# Patient Record
Sex: FEMALE | Race: WHITE | NOT HISPANIC OR LATINO | Employment: OTHER | URBAN - METROPOLITAN AREA
[De-identification: names, ages, dates, MRNs, and addresses within clinical notes are randomized per-mention and may not be internally consistent; named-entity substitution may affect disease eponyms.]

---

## 2019-07-23 ENCOUNTER — HOSPITAL ENCOUNTER (OUTPATIENT)
Dept: LAB | Facility: MEDICAL CENTER | Age: 54
End: 2019-07-23
Attending: FAMILY MEDICINE
Payer: COMMERCIAL

## 2019-07-23 LAB
25(OH)D3 SERPL-MCNC: 41 NG/ML (ref 30–100)
APPEARANCE UR: CLEAR
BASOPHILS # BLD AUTO: 1.1 % (ref 0–1.8)
BASOPHILS # BLD: 0.08 K/UL (ref 0–0.12)
BILIRUB UR QL STRIP.AUTO: NEGATIVE
COLOR UR: YELLOW
DHEA-S SERPL-MCNC: 70.7 UG/DL (ref 18.9–205)
EOSINOPHIL # BLD AUTO: 0.1 K/UL (ref 0–0.51)
EOSINOPHIL NFR BLD: 1.4 % (ref 0–6.9)
ERYTHROCYTE [DISTWIDTH] IN BLOOD BY AUTOMATED COUNT: 44.3 FL (ref 35.9–50)
EST. AVERAGE GLUCOSE BLD GHB EST-MCNC: 108 MG/DL
ESTRADIOL SERPL-MCNC: <20 PG/ML
FERRITIN SERPL-MCNC: 41.5 NG/ML (ref 10–291)
GGT SERPL-CCNC: 13 U/L (ref 7–34)
GLUCOSE UR STRIP.AUTO-MCNC: NEGATIVE MG/DL
HBA1C MFR BLD: 5.4 % (ref 0–5.6)
HCT VFR BLD AUTO: 43.9 % (ref 37–47)
HGB BLD-MCNC: 14.6 G/DL (ref 12–16)
IMM GRANULOCYTES # BLD AUTO: 0.01 K/UL (ref 0–0.11)
IMM GRANULOCYTES NFR BLD AUTO: 0.1 % (ref 0–0.9)
KETONES UR STRIP.AUTO-MCNC: ABNORMAL MG/DL
LEUKOCYTE ESTERASE UR QL STRIP.AUTO: NEGATIVE
LYMPHOCYTES # BLD AUTO: 2.21 K/UL (ref 1–4.8)
LYMPHOCYTES NFR BLD: 31 % (ref 22–41)
MCH RBC QN AUTO: 30.3 PG (ref 27–33)
MCHC RBC AUTO-ENTMCNC: 33.3 G/DL (ref 33.6–35)
MCV RBC AUTO: 91.1 FL (ref 81.4–97.8)
MICRO URNS: ABNORMAL
MONOCYTES # BLD AUTO: 0.66 K/UL (ref 0–0.85)
MONOCYTES NFR BLD AUTO: 9.2 % (ref 0–13.4)
NEUTROPHILS # BLD AUTO: 4.08 K/UL (ref 2–7.15)
NEUTROPHILS NFR BLD: 57.2 % (ref 44–72)
NITRITE UR QL STRIP.AUTO: NEGATIVE
NRBC # BLD AUTO: 0 K/UL
NRBC BLD-RTO: 0 /100 WBC
PH UR STRIP.AUTO: 6 [PH]
PLATELET # BLD AUTO: 343 K/UL (ref 164–446)
PMV BLD AUTO: 9.9 FL (ref 9–12.9)
PROGEST SERPL-MCNC: 1.53 NG/ML
PROT UR QL STRIP: NEGATIVE MG/DL
RBC # BLD AUTO: 4.82 M/UL (ref 4.2–5.4)
RBC UR QL AUTO: NEGATIVE
SP GR UR STRIP.AUTO: <=1.005
T3FREE SERPL-MCNC: 3.53 PG/ML (ref 2.4–4.2)
T4 FREE SERPL-MCNC: 0.86 NG/DL (ref 0.53–1.43)
TSH SERPL DL<=0.005 MIU/L-ACNC: 1.91 UIU/ML (ref 0.38–5.33)
UROBILINOGEN UR STRIP.AUTO-MCNC: 0.2 MG/DL
WBC # BLD AUTO: 7.1 K/UL (ref 4.8–10.8)

## 2019-07-23 PROCEDURE — 84305 ASSAY OF SOMATOMEDIN: CPT

## 2019-07-23 PROCEDURE — 84482 T3 REVERSE: CPT

## 2019-07-23 PROCEDURE — 82977 ASSAY OF GGT: CPT

## 2019-07-23 PROCEDURE — 80053 COMPREHEN METABOLIC PANEL: CPT

## 2019-07-23 PROCEDURE — 84270 ASSAY OF SEX HORMONE GLOBUL: CPT

## 2019-07-23 PROCEDURE — 80061 LIPID PANEL: CPT

## 2019-07-23 PROCEDURE — 83615 LACTATE (LD) (LDH) ENZYME: CPT

## 2019-07-23 PROCEDURE — 82627 DEHYDROEPIANDROSTERONE: CPT

## 2019-07-23 PROCEDURE — 83036 HEMOGLOBIN GLYCOSYLATED A1C: CPT

## 2019-07-23 PROCEDURE — 84481 FREE ASSAY (FT-3): CPT

## 2019-07-23 PROCEDURE — 82670 ASSAY OF TOTAL ESTRADIOL: CPT

## 2019-07-23 PROCEDURE — 84550 ASSAY OF BLOOD/URIC ACID: CPT

## 2019-07-23 PROCEDURE — 83090 ASSAY OF HOMOCYSTEINE: CPT

## 2019-07-23 PROCEDURE — 82306 VITAMIN D 25 HYDROXY: CPT

## 2019-07-23 PROCEDURE — 81003 URINALYSIS AUTO W/O SCOPE: CPT

## 2019-07-23 PROCEDURE — 83550 IRON BINDING TEST: CPT

## 2019-07-23 PROCEDURE — 84144 ASSAY OF PROGESTERONE: CPT

## 2019-07-23 PROCEDURE — 84378 SUGARS SINGLE QUANT: CPT

## 2019-07-23 PROCEDURE — 83525 ASSAY OF INSULIN: CPT

## 2019-07-23 PROCEDURE — 85025 COMPLETE CBC W/AUTO DIFF WBC: CPT

## 2019-07-23 PROCEDURE — 82728 ASSAY OF FERRITIN: CPT

## 2019-07-23 PROCEDURE — 86141 C-REACTIVE PROTEIN HS: CPT

## 2019-07-23 PROCEDURE — 84443 ASSAY THYROID STIM HORMONE: CPT

## 2019-07-23 PROCEDURE — 83540 ASSAY OF IRON: CPT

## 2019-07-23 PROCEDURE — 84439 ASSAY OF FREE THYROXINE: CPT

## 2019-07-23 PROCEDURE — 82679 ASSAY OF ESTRONE: CPT

## 2019-07-23 PROCEDURE — 84403 ASSAY OF TOTAL TESTOSTERONE: CPT

## 2019-07-23 PROCEDURE — 84100 ASSAY OF PHOSPHORUS: CPT

## 2019-07-23 PROCEDURE — 36415 COLL VENOUS BLD VENIPUNCTURE: CPT

## 2019-07-24 LAB
ALBUMIN SERPL BCP-MCNC: 4.9 G/DL (ref 3.2–4.9)
ALBUMIN/GLOB SERPL: 1.8 G/DL
ALP SERPL-CCNC: 46 U/L (ref 30–99)
ALT SERPL-CCNC: 15 U/L (ref 2–50)
ANION GAP SERPL CALC-SCNC: 7 MMOL/L (ref 0–11.9)
AST SERPL-CCNC: 17 U/L (ref 12–45)
BILIRUB SERPL-MCNC: 0.5 MG/DL (ref 0.1–1.5)
BUN SERPL-MCNC: 10 MG/DL (ref 8–22)
CALCIUM SERPL-MCNC: 9.7 MG/DL (ref 8.5–10.5)
CHLORIDE SERPL-SCNC: 105 MMOL/L (ref 96–112)
CHOLEST SERPL-MCNC: 225 MG/DL (ref 100–199)
CO2 SERPL-SCNC: 27 MMOL/L (ref 20–33)
CREAT SERPL-MCNC: 0.7 MG/DL (ref 0.5–1.4)
CRP SERPL HS-MCNC: 0.5 MG/L (ref 0–7.5)
GLOBULIN SER CALC-MCNC: 2.8 G/DL (ref 1.9–3.5)
GLUCOSE SERPL-MCNC: 93 MG/DL (ref 65–99)
HCYS SERPL-SCNC: 5.39 UMOL/L
HDLC SERPL-MCNC: 86 MG/DL
IRON SATN MFR SERPL: 35 % (ref 15–55)
IRON SERPL-MCNC: 131 UG/DL (ref 40–170)
LDH SERPL L TO P-CCNC: 149 U/L (ref 107–266)
LDLC SERPL CALC-MCNC: 123 MG/DL
PHOSPHATE SERPL-MCNC: 3.8 MG/DL (ref 2.5–4.5)
POTASSIUM SERPL-SCNC: 3.5 MMOL/L (ref 3.6–5.5)
PROT SERPL-MCNC: 7.7 G/DL (ref 6–8.2)
SODIUM SERPL-SCNC: 139 MMOL/L (ref 135–145)
TIBC SERPL-MCNC: 372 UG/DL (ref 250–450)
TRIGL SERPL-MCNC: 82 MG/DL (ref 0–149)
URATE SERPL-MCNC: 3.5 MG/DL (ref 1.9–8.2)

## 2019-07-25 LAB
IGF-I SERPL-MCNC: 132 NG/ML (ref 49–234)
IGF-I Z-SCORE SERPL: 0.3
INSULIN P FAST SERPL-ACNC: 3 UIU/ML (ref 3–19)
TEST NAME 95000: NORMAL

## 2019-07-26 LAB
ESTRONE SERPL-MCNC: 17.2 PG/ML
T3REVERSE SERPL-MCNC: 15.6 NG/DL (ref 9–27)

## 2019-07-27 LAB
SHBG SERPL-SCNC: 183 NMOL/L (ref 30–135)
TESTOST FREE SERPL-MCNC: 3.2 PG/ML (ref 0.6–3.8)
TESTOST SERPL-MCNC: 67 NG/DL (ref 9–55)

## 2020-01-13 ENCOUNTER — HOSPITAL ENCOUNTER (OUTPATIENT)
Dept: LAB | Facility: MEDICAL CENTER | Age: 55
End: 2020-01-13
Attending: FAMILY MEDICINE
Payer: COMMERCIAL

## 2020-01-13 LAB
BASOPHILS # BLD AUTO: 1.3 % (ref 0–1.8)
BASOPHILS # BLD: 0.09 K/UL (ref 0–0.12)
EOSINOPHIL # BLD AUTO: 0.11 K/UL (ref 0–0.51)
EOSINOPHIL NFR BLD: 1.6 % (ref 0–6.9)
ERYTHROCYTE [DISTWIDTH] IN BLOOD BY AUTOMATED COUNT: 45.3 FL (ref 35.9–50)
HCT VFR BLD AUTO: 43.4 % (ref 37–47)
HGB BLD-MCNC: 14.1 G/DL (ref 12–16)
IMM GRANULOCYTES # BLD AUTO: 0.02 K/UL (ref 0–0.11)
IMM GRANULOCYTES NFR BLD AUTO: 0.3 % (ref 0–0.9)
LYMPHOCYTES # BLD AUTO: 2.1 K/UL (ref 1–4.8)
LYMPHOCYTES NFR BLD: 30.7 % (ref 22–41)
MCH RBC QN AUTO: 30.1 PG (ref 27–33)
MCHC RBC AUTO-ENTMCNC: 32.5 G/DL (ref 33.6–35)
MCV RBC AUTO: 92.5 FL (ref 81.4–97.8)
MONOCYTES # BLD AUTO: 0.61 K/UL (ref 0–0.85)
MONOCYTES NFR BLD AUTO: 8.9 % (ref 0–13.4)
NEUTROPHILS # BLD AUTO: 3.9 K/UL (ref 2–7.15)
NEUTROPHILS NFR BLD: 57.2 % (ref 44–72)
NRBC # BLD AUTO: 0 K/UL
NRBC BLD-RTO: 0 /100 WBC
PLATELET # BLD AUTO: 343 K/UL (ref 164–446)
PMV BLD AUTO: 9.7 FL (ref 9–12.9)
RBC # BLD AUTO: 4.69 M/UL (ref 4.2–5.4)
WBC # BLD AUTO: 6.8 K/UL (ref 4.8–10.8)

## 2020-01-13 PROCEDURE — 80053 COMPREHEN METABOLIC PANEL: CPT

## 2020-01-13 PROCEDURE — 36415 COLL VENOUS BLD VENIPUNCTURE: CPT

## 2020-01-13 PROCEDURE — 82679 ASSAY OF ESTRONE: CPT

## 2020-01-13 PROCEDURE — 82306 VITAMIN D 25 HYDROXY: CPT

## 2020-01-13 PROCEDURE — 84140 ASSAY OF PREGNENOLONE: CPT

## 2020-01-13 PROCEDURE — 84144 ASSAY OF PROGESTERONE: CPT

## 2020-01-13 PROCEDURE — 84443 ASSAY THYROID STIM HORMONE: CPT

## 2020-01-13 PROCEDURE — 83525 ASSAY OF INSULIN: CPT

## 2020-01-13 PROCEDURE — 83090 ASSAY OF HOMOCYSTEINE: CPT

## 2020-01-13 PROCEDURE — 86038 ANTINUCLEAR ANTIBODIES: CPT

## 2020-01-13 PROCEDURE — 80061 LIPID PANEL: CPT

## 2020-01-13 PROCEDURE — 82627 DEHYDROEPIANDROSTERONE: CPT

## 2020-01-13 PROCEDURE — 84270 ASSAY OF SEX HORMONE GLOBUL: CPT

## 2020-01-13 PROCEDURE — 84482 T3 REVERSE: CPT

## 2020-01-13 PROCEDURE — 84403 ASSAY OF TOTAL TESTOSTERONE: CPT

## 2020-01-13 PROCEDURE — 84305 ASSAY OF SOMATOMEDIN: CPT

## 2020-01-13 PROCEDURE — 83516 IMMUNOASSAY NONANTIBODY: CPT

## 2020-01-13 PROCEDURE — 86141 C-REACTIVE PROTEIN HS: CPT

## 2020-01-13 PROCEDURE — 86039 ANTINUCLEAR ANTIBODIES (ANA): CPT

## 2020-01-13 PROCEDURE — 82248 BILIRUBIN DIRECT: CPT

## 2020-01-13 PROCEDURE — 84402 ASSAY OF FREE TESTOSTERONE: CPT

## 2020-01-13 PROCEDURE — 84439 ASSAY OF FREE THYROXINE: CPT

## 2020-01-13 PROCEDURE — 84146 ASSAY OF PROLACTIN: CPT

## 2020-01-13 PROCEDURE — 82670 ASSAY OF TOTAL ESTRADIOL: CPT

## 2020-01-13 PROCEDURE — 85025 COMPLETE CBC W/AUTO DIFF WBC: CPT

## 2020-01-13 PROCEDURE — 83036 HEMOGLOBIN GLYCOSYLATED A1C: CPT

## 2020-01-13 PROCEDURE — 84481 FREE ASSAY (FT-3): CPT

## 2020-01-14 LAB
25(OH)D3 SERPL-MCNC: 50 NG/ML (ref 30–100)
ALBUMIN SERPL BCP-MCNC: 4.6 G/DL (ref 3.2–4.9)
ALBUMIN/GLOB SERPL: 1.7 G/DL
ALP SERPL-CCNC: 51 U/L (ref 30–99)
ALT SERPL-CCNC: 12 U/L (ref 2–50)
ANION GAP SERPL CALC-SCNC: 11 MMOL/L (ref 0–11.9)
AST SERPL-CCNC: 17 U/L (ref 12–45)
BILIRUB SERPL-MCNC: 0.6 MG/DL (ref 0.1–1.5)
BUN SERPL-MCNC: 13 MG/DL (ref 8–22)
CALCIUM SERPL-MCNC: 8.9 MG/DL (ref 8.5–10.5)
CHLORIDE SERPL-SCNC: 103 MMOL/L (ref 96–112)
CHOLEST SERPL-MCNC: 191 MG/DL (ref 100–199)
CO2 SERPL-SCNC: 27 MMOL/L (ref 20–33)
CREAT SERPL-MCNC: 0.66 MG/DL (ref 0.5–1.4)
CRP SERPL HS-MCNC: 1 MG/L (ref 0–7.5)
DHEA-S SERPL-MCNC: 74.5 UG/DL (ref 18.9–205)
EST. AVERAGE GLUCOSE BLD GHB EST-MCNC: 108 MG/DL
ESTRADIOL SERPL-MCNC: 22 PG/ML
GLOBULIN SER CALC-MCNC: 2.7 G/DL (ref 1.9–3.5)
GLUCOSE SERPL-MCNC: 77 MG/DL (ref 65–99)
HBA1C MFR BLD: 5.4 % (ref 0–5.6)
HCYS SERPL-SCNC: 6.72 UMOL/L
HDLC SERPL-MCNC: 85 MG/DL
LDLC SERPL CALC-MCNC: 94 MG/DL
POTASSIUM SERPL-SCNC: 3.6 MMOL/L (ref 3.6–5.5)
PROLACTIN SERPL-MCNC: 11.42 NG/ML (ref 2.8–26)
PROT SERPL-MCNC: 7.3 G/DL (ref 6–8.2)
SODIUM SERPL-SCNC: 141 MMOL/L (ref 135–145)
T3FREE SERPL-MCNC: 4.45 PG/ML (ref 2.4–4.2)
T4 FREE SERPL-MCNC: 0.78 NG/DL (ref 0.53–1.43)
TRIGL SERPL-MCNC: 59 MG/DL (ref 0–149)
TSH SERPL DL<=0.005 MIU/L-ACNC: 0.71 UIU/ML (ref 0.38–5.33)

## 2020-01-15 LAB
BILIRUB CONJ SERPL-MCNC: 0.1 MG/DL (ref 0.1–0.5)
BILIRUB INDIRECT SERPL-MCNC: 0.5 MG/DL (ref 0–1)
IGF-I SERPL-MCNC: 126 NG/ML (ref 49–234)
IGF-I Z-SCORE SERPL: 0.2
INSULIN P FAST SERPL-ACNC: 2 UIU/ML (ref 3–19)
MITOCHONDRIA M2 IGG SER-ACNC: 96.6 UNITS (ref 0–20)
NUCLEAR IGG SER QL IA: DETECTED
PREG SERPL-MCNC: 53 NG/DL (ref 15–132)
PROGEST SERPL-MCNC: 1.54 NG/ML

## 2020-01-16 LAB
ANA INTERPRETIVE COMMENT Q5143: ABNORMAL
ANA PATTERN Q5144: ABNORMAL
ANA TITER Q5145: ABNORMAL
ANTINUCLEAR ANTIBODY (ANA), HEP-2, IGG Q5142: DETECTED
ESTRONE SERPL-MCNC: 22.5 PG/ML

## 2020-01-17 LAB — T3REVERSE SERPL-MCNC: 19.4 NG/DL (ref 9–27)

## 2020-01-24 LAB
SHBG SERPL-SCNC: 199 NMOL/L (ref 30–135)
TESTOST FREE SERPL-MCNC: 0.7 PG/ML (ref 0.6–3.8)
TESTOST SERPL-MCNC: 15 NG/DL (ref 9–55)

## 2020-07-15 ENCOUNTER — APPOINTMENT (OUTPATIENT)
Dept: RADIOLOGY | Facility: MEDICAL CENTER | Age: 55
End: 2020-07-15
Attending: OTOLARYNGOLOGY
Payer: COMMERCIAL

## 2020-07-30 ENCOUNTER — APPOINTMENT (OUTPATIENT)
Dept: LAB | Facility: MEDICAL CENTER | Age: 55
End: 2020-07-30
Attending: FAMILY MEDICINE
Payer: COMMERCIAL

## 2020-07-31 ENCOUNTER — HOSPITAL ENCOUNTER (OUTPATIENT)
Dept: LAB | Facility: MEDICAL CENTER | Age: 55
End: 2020-07-31
Attending: FAMILY MEDICINE
Payer: COMMERCIAL

## 2020-07-31 LAB
25(OH)D3 SERPL-MCNC: 154 NG/ML (ref 30–100)
ALBUMIN SERPL BCP-MCNC: 4.6 G/DL (ref 3.2–4.9)
ALBUMIN/GLOB SERPL: 1.8 G/DL
ALP SERPL-CCNC: 71 U/L (ref 30–99)
ALT SERPL-CCNC: 23 U/L (ref 2–50)
ANION GAP SERPL CALC-SCNC: 13 MMOL/L (ref 7–16)
AST SERPL-CCNC: 17 U/L (ref 12–45)
BASOPHILS # BLD AUTO: 1.2 % (ref 0–1.8)
BASOPHILS # BLD: 0.07 K/UL (ref 0–0.12)
BILIRUB SERPL-MCNC: 0.3 MG/DL (ref 0.1–1.5)
BUN SERPL-MCNC: 18 MG/DL (ref 8–22)
CALCIUM SERPL-MCNC: 9.3 MG/DL (ref 8.5–10.5)
CHLORIDE SERPL-SCNC: 101 MMOL/L (ref 96–112)
CHOLEST SERPL-MCNC: 252 MG/DL (ref 100–199)
CO2 SERPL-SCNC: 25 MMOL/L (ref 20–33)
CREAT SERPL-MCNC: 0.59 MG/DL (ref 0.5–1.4)
CRP SERPL HS-MCNC: 0.4 MG/L (ref 0–7.5)
DHEA-S SERPL-MCNC: 61.9 UG/DL (ref 18.9–205)
EOSINOPHIL # BLD AUTO: 0.09 K/UL (ref 0–0.51)
EOSINOPHIL NFR BLD: 1.6 % (ref 0–6.9)
ERYTHROCYTE [DISTWIDTH] IN BLOOD BY AUTOMATED COUNT: 47.9 FL (ref 35.9–50)
EST. AVERAGE GLUCOSE BLD GHB EST-MCNC: 105 MG/DL
ESTRADIOL SERPL-MCNC: <5 PG/ML
FERRITIN SERPL-MCNC: 103 NG/ML (ref 10–291)
GLOBULIN SER CALC-MCNC: 2.5 G/DL (ref 1.9–3.5)
GLUCOSE SERPL-MCNC: 93 MG/DL (ref 65–99)
HBA1C MFR BLD: 5.3 % (ref 0–5.6)
HCT VFR BLD AUTO: 45.7 % (ref 37–47)
HDLC SERPL-MCNC: 95 MG/DL
HGB BLD-MCNC: 14.5 G/DL (ref 12–16)
IMM GRANULOCYTES # BLD AUTO: 0.01 K/UL (ref 0–0.11)
IMM GRANULOCYTES NFR BLD AUTO: 0.2 % (ref 0–0.9)
LDLC SERPL CALC-MCNC: 150 MG/DL
LYMPHOCYTES # BLD AUTO: 2.05 K/UL (ref 1–4.8)
LYMPHOCYTES NFR BLD: 35.5 % (ref 22–41)
MCH RBC QN AUTO: 29.2 PG (ref 27–33)
MCHC RBC AUTO-ENTMCNC: 31.7 G/DL (ref 33.6–35)
MCV RBC AUTO: 92.1 FL (ref 81.4–97.8)
MONOCYTES # BLD AUTO: 0.43 K/UL (ref 0–0.85)
MONOCYTES NFR BLD AUTO: 7.5 % (ref 0–13.4)
NEUTROPHILS # BLD AUTO: 3.12 K/UL (ref 2–7.15)
NEUTROPHILS NFR BLD: 54 % (ref 44–72)
NRBC # BLD AUTO: 0 K/UL
NRBC BLD-RTO: 0 /100 WBC
PLATELET # BLD AUTO: 293 K/UL (ref 164–446)
PMV BLD AUTO: 10.9 FL (ref 9–12.9)
POTASSIUM SERPL-SCNC: 4.1 MMOL/L (ref 3.6–5.5)
PROT SERPL-MCNC: 7.1 G/DL (ref 6–8.2)
RBC # BLD AUTO: 4.96 M/UL (ref 4.2–5.4)
SODIUM SERPL-SCNC: 139 MMOL/L (ref 135–145)
T3FREE SERPL-MCNC: 2.76 PG/ML (ref 2–4.4)
T4 FREE SERPL-MCNC: 1.17 NG/DL (ref 0.93–1.7)
THYROPEROXIDASE AB SERPL-ACNC: <9 IU/ML (ref 0–9)
TRIGL SERPL-MCNC: 37 MG/DL (ref 0–149)
TSH SERPL DL<=0.005 MIU/L-ACNC: 1.54 UIU/ML (ref 0.38–5.33)
WBC # BLD AUTO: 5.8 K/UL (ref 4.8–10.8)

## 2020-07-31 PROCEDURE — 82627 DEHYDROEPIANDROSTERONE: CPT

## 2020-07-31 PROCEDURE — 84270 ASSAY OF SEX HORMONE GLOBUL: CPT

## 2020-07-31 PROCEDURE — 84481 FREE ASSAY (FT-3): CPT

## 2020-07-31 PROCEDURE — 86800 THYROGLOBULIN ANTIBODY: CPT

## 2020-07-31 PROCEDURE — 86039 ANTINUCLEAR ANTIBODIES (ANA): CPT

## 2020-07-31 PROCEDURE — 82306 VITAMIN D 25 HYDROXY: CPT

## 2020-07-31 PROCEDURE — 84482 T3 REVERSE: CPT

## 2020-07-31 PROCEDURE — 83921 ORGANIC ACID SINGLE QUANT: CPT

## 2020-07-31 PROCEDURE — 80061 LIPID PANEL: CPT

## 2020-07-31 PROCEDURE — 36415 COLL VENOUS BLD VENIPUNCTURE: CPT

## 2020-07-31 PROCEDURE — 82679 ASSAY OF ESTRONE: CPT

## 2020-07-31 PROCEDURE — 84144 ASSAY OF PROGESTERONE: CPT

## 2020-07-31 PROCEDURE — 83516 IMMUNOASSAY NONANTIBODY: CPT

## 2020-07-31 PROCEDURE — 83036 HEMOGLOBIN GLYCOSYLATED A1C: CPT

## 2020-07-31 PROCEDURE — 84403 ASSAY OF TOTAL TESTOSTERONE: CPT

## 2020-07-31 PROCEDURE — 86038 ANTINUCLEAR ANTIBODIES: CPT

## 2020-07-31 PROCEDURE — 80053 COMPREHEN METABOLIC PANEL: CPT

## 2020-07-31 PROCEDURE — 83520 IMMUNOASSAY QUANT NOS NONAB: CPT | Mod: 91

## 2020-07-31 PROCEDURE — 86141 C-REACTIVE PROTEIN HS: CPT

## 2020-07-31 PROCEDURE — 85652 RBC SED RATE AUTOMATED: CPT

## 2020-07-31 PROCEDURE — 85025 COMPLETE CBC W/AUTO DIFF WBC: CPT

## 2020-07-31 PROCEDURE — 83525 ASSAY OF INSULIN: CPT

## 2020-07-31 PROCEDURE — 82670 ASSAY OF TOTAL ESTRADIOL: CPT

## 2020-07-31 PROCEDURE — 83090 ASSAY OF HOMOCYSTEINE: CPT

## 2020-07-31 PROCEDURE — 84443 ASSAY THYROID STIM HORMONE: CPT

## 2020-07-31 PROCEDURE — 84402 ASSAY OF FREE TESTOSTERONE: CPT

## 2020-07-31 PROCEDURE — 82728 ASSAY OF FERRITIN: CPT

## 2020-07-31 PROCEDURE — 84439 ASSAY OF FREE THYROXINE: CPT

## 2020-07-31 PROCEDURE — 84305 ASSAY OF SOMATOMEDIN: CPT

## 2020-07-31 PROCEDURE — 86376 MICROSOMAL ANTIBODY EACH: CPT

## 2020-08-01 LAB
ERYTHROCYTE [SEDIMENTATION RATE] IN BLOOD BY WESTERGREN METHOD: 4 MM/HOUR (ref 0–30)
HCYS SERPL-SCNC: 5.43 UMOL/L
PROGEST SERPL-MCNC: 0.08 NG/ML

## 2020-08-02 LAB
MITOCHONDRIA M2 IGG SER-ACNC: 90.1 UNITS (ref 0–20)
NUCLEAR IGG SER QL IA: DETECTED

## 2020-08-03 LAB
IL6 SERPL-MCNC: <2 PG/ML
MISCELLANEOUS LAB RESULT MISCLAB: NORMAL

## 2020-08-04 LAB
ANA INTERPRETIVE COMMENT Q5143: ABNORMAL
ANA PATTERN Q5144: ABNORMAL
ANA TITER Q5145: ABNORMAL
ANTINUCLEAR ANTIBODY (ANA), HEP-2, IGG Q5142: DETECTED

## 2020-08-05 LAB
ESTRONE SERPL-MCNC: 13.7 PG/ML
INSULIN P FAST SERPL-ACNC: 4 UIU/ML (ref 3–19)

## 2020-08-06 LAB — METHYLMALONATE SERPL-SCNC: <0.1 UMOL/L (ref 0–0.4)

## 2020-08-07 LAB
SHBG SERPL-SCNC: 184 NMOL/L (ref 30–135)
TESTOST FREE SERPL-MCNC: 1.8 PG/ML (ref 0.6–3.8)
TESTOST SERPL-MCNC: 38 NG/DL (ref 9–55)

## 2020-08-08 LAB — T3REVERSE SERPL-MCNC: 15.5 NG/DL (ref 9–27)

## 2020-08-11 LAB
IGF-I SERPL-MCNC: 93 NG/ML (ref 48–235)
IGF-I Z-SCORE SERPL: -0.5
THYROGLOB AB SERPL-ACNC: <0.9 IU/ML (ref 0–4)

## 2020-08-12 ENCOUNTER — HOSPITAL ENCOUNTER (OUTPATIENT)
Dept: RADIOLOGY | Facility: MEDICAL CENTER | Age: 55
End: 2020-08-12
Attending: FAMILY MEDICINE
Payer: COMMERCIAL

## 2020-08-12 DIAGNOSIS — M81.0 SENILE OSTEOPOROSIS: ICD-10-CM

## 2020-08-12 PROCEDURE — 77080 DXA BONE DENSITY AXIAL: CPT

## 2020-10-07 ENCOUNTER — HOSPITAL ENCOUNTER (OUTPATIENT)
Dept: LAB | Facility: MEDICAL CENTER | Age: 55
End: 2020-10-07
Attending: FAMILY MEDICINE
Payer: COMMERCIAL

## 2020-10-07 LAB
CA-I SERPL-SCNC: 1.2 MMOL/L (ref 1.1–1.3)
CALCIUM SERPL-MCNC: 9.2 MG/DL (ref 8.5–10.5)
PTH-INTACT SERPL-MCNC: 30.7 PG/ML (ref 14–72)

## 2020-10-07 PROCEDURE — 86160 COMPLEMENT ANTIGEN: CPT

## 2020-10-07 PROCEDURE — 84586 ASSAY OF VIP: CPT

## 2020-10-07 PROCEDURE — 83520 IMMUNOASSAY QUANT NOS NONAB: CPT | Mod: 91

## 2020-10-07 PROCEDURE — 82310 ASSAY OF CALCIUM: CPT

## 2020-10-07 PROCEDURE — 83970 ASSAY OF PARATHORMONE: CPT

## 2020-10-07 PROCEDURE — 82330 ASSAY OF CALCIUM: CPT

## 2020-10-07 PROCEDURE — 83516 IMMUNOASSAY NONANTIBODY: CPT

## 2020-10-07 PROCEDURE — 84588 ASSAY OF VASOPRESSIN: CPT

## 2020-10-07 PROCEDURE — 36415 COLL VENOUS BLD VENIPUNCTURE: CPT

## 2020-10-07 PROCEDURE — 83519 RIA NONANTIBODY: CPT

## 2020-10-09 LAB — GLIADIN IGA SER IA-ACNC: 3 UNITS (ref 0–19)

## 2020-10-12 LAB
MISCELLANEOUS LAB RESULT MISCLAB: NORMAL
VIP SERPL-MCNC: <13 PG/ML (ref 0–60)

## 2020-10-15 LAB — MISCELLANEOUS LAB RESULT MISCLAB: NORMAL

## 2020-10-16 LAB — MISCELLANEOUS LAB RESULT MISCLAB: NORMAL

## 2020-10-21 ENCOUNTER — OFFICE VISIT (OUTPATIENT)
Dept: RHEUMATOLOGY | Facility: MEDICAL CENTER | Age: 55
End: 2020-10-21
Payer: COMMERCIAL

## 2020-10-21 VITALS
DIASTOLIC BLOOD PRESSURE: 50 MMHG | TEMPERATURE: 97.7 F | RESPIRATION RATE: 12 BRPM | WEIGHT: 109 LBS | BODY MASS INDEX: 19.31 KG/M2 | HEART RATE: 76 BPM | HEIGHT: 63 IN | OXYGEN SATURATION: 99 % | SYSTOLIC BLOOD PRESSURE: 90 MMHG

## 2020-10-21 DIAGNOSIS — E03.9 HYPOTHYROIDISM, UNSPECIFIED TYPE: ICD-10-CM

## 2020-10-21 DIAGNOSIS — M34.1 CREST SYNDROME (HCC): ICD-10-CM

## 2020-10-21 DIAGNOSIS — K75.4 AUTOIMMUNE HEPATITIS (HCC): ICD-10-CM

## 2020-10-21 DIAGNOSIS — I73.00 RAYNAUD'S DISEASE WITHOUT GANGRENE: ICD-10-CM

## 2020-10-21 DIAGNOSIS — N39.3 STRESS INCONTINENCE: ICD-10-CM

## 2020-10-21 DIAGNOSIS — R76.8 RHEUMATOID FACTOR POSITIVE: ICD-10-CM

## 2020-10-21 PROCEDURE — 99205 OFFICE O/P NEW HI 60 MIN: CPT | Performed by: INTERNAL MEDICINE

## 2020-10-21 ASSESSMENT — FIBROSIS 4 INDEX: FIB4 SCORE: 0.68

## 2020-10-21 NOTE — PROGRESS NOTES
Chief Complaint-    Chief Complaint   Patient presents with   • New Patient     Yaneli Gaspar is a 55 y.o. female here as a new Rheumatology Consult referred by Fede Santos D.O.  HPI:   This is a new patient evaluation   Patient brings in a 2 inch thick bundle of paperwork for us to review today.    This is a new patient evaluation, patient was referred for evaluation of a past diagnosis of scleroderma/crest/rheumatoid arthritis.  Patient was diagnosed about 10 years ago patient states she had symptoms about 5 years prior to that with symptoms of esophageal dysplasia requiring a dose of corticosteroids to help calm down esophageal dysplasia.  Patient states she has never had strictures nor any dilation of her esophagus.  Patient initially saw Dr. Aguirre with [facility] who did more testing, patient was resistant to be put on any treatment options and has managed her own symptoms with exercise, diet and vitamin supplements.  Patient states that she is followed by Dr. Molina in cardiology, follows Dr. Ornelas every 3 years last echocardiogram about 1 year ago and normal per patient report.  Patient denies any shortness of breath, does get some mild Raynaud's but no digital tip ulcerations, does get occasional esophageal dysplasia but denies any aspiration, denies any calcinosis denies any hidebound skin.    Patient states positive AMA, s/p eval by GI and s/p liver scan still ok per patient report    S/p PFT's and cardiology Dr Molina, patient sees q 3 years    On physical exam also noted lymph node has been ongoing for about 6 months right lower jaw    Other issues patient also complains of stress incontinence has an appointment to see urology for further evaluation.    Patient also complains of her hair hurting and skin hurting today as well, of note patient's mother carries a diagnosis of fibromyalgia.         PMHx  Hypothyroid  Tonsillectomy  Ovarian cyst removed  Bilateral breast  implants    Fam Hx  F passed from oral cancer, hx of testicular cancer  M-hypothyroid, fibromyalgia  SX1- ulcerative colitis  Daughter-celiac disease    Soc Hx  No tob  ETOH q 2 months  No blood tx  No tattoos  NO IVDA    ANTHONY 1:160 6/2012 [facility]  Anti centromere >8.0 6/2012 [facility]  AMA 96.6 1/2020; AMA 90.1 8/2020  dsDNA neg 6/2012 [facility]  RNP neg 6/2012 [facility]  Smith neg 6/2012 [facility]  SCL-70 neg6/2012 [facility]  SSA neg 6/2012 [facility]  SSB neg 6/2012 [facility]  WILL-1 neg 6/2012 [facility]  C3 91 nl 6/2012 [facility]  C4 22 nl 6/2012 [facility]  CCP neg 6/2012 [facility]  RA 16.1 7/2015 [facility]  Gliadin ab neg 10/2020  TPO neg 7/2020  Thyroglobulin ab neg 7/2020  DEXA 8/12/2020 T scores -3.2, -2.4  FRAX 8/12/2020 major osteoporotic fracture risk 13.0%, hip fracture risk 3.9%    Addendum 12/14/2020  DATE OF SERVICE:  12/09/2020      PULMONARY FUNCTION TEST INTERPRETATION  The patient had pulmonary function testing done on 12/09/2020.  The pulmonary   function testing showed an FEV1/FVC ratio of 81% predicted, which is normal   and does not represent any evidence of obstruction.  The total lung capacity   is also normal at 112% predicted, not consistent with any restrictive lung   disease.  The diffusion capacity is normal at 122% predicted, which is also   normal.  In summary, the pulmonary function testing including spirometry, the   flow-volume loops, the lung volumes, and the diffusion capacity are all within   normal limits.         Current medicines (including changes today)  Current Outpatient Medications   Medication Sig Dispense Refill   • NALTREXONE HCL PO Take  by mouth. LDN     • Transdermal Base CREA 2.5 mg by Apply externally route.     • progesterone (PROMETRIUM) 100 MG CAPS Take 100 mg by mouth every day.     • Testosterone 10 MG/ACT (2%) GEL Apply  to skin as directed.     • METHYLCOBALAMIN 1 mg by Does not apply route.     • NS SOLN 50 mL with folic acid 5 MG/ML SOLN 10 mg 10 mg by Intravenous route Once. 50 mL     • Potassium 99 MG TABS Take  by mouth.     • Probiotic Product (SOLUBLE FIBER/PROBIOTICS PO) Take  by mouth.     • MAGNESIUM ACETATE by Does not apply route.     • THYROID PO Take  by mouth. COMPOUND T3 T4 1.5 GRAIN DAILY      • Multiple Vitamin (DAILY MULTIVITAMIN) TABS Take  by mouth.     • VITAMIN D PO Take  by mouth.     • Ascorbic Acid (VITAMIN C PO) Take  by mouth.     • FISH OIL by Does not apply route.     • Methylsulfonylmethane (MSM PO) Take  by mouth.     • epinephrine (ADRENALIN) 0.1 MG/ML SOLN 2 mg by Injection route Once.     • Apoaequorin (PREVAGEN PO) Take 40 mg by mouth.       No current facility-administered medications for this visit.      She  has a past medical history of Chronic fatigue, Infectious disease, Raynauds syndrome, Snoring, Unspecified disorder of thyroid, and Urinary bladder disorder.  She  has a past surgical history that includes tonsillectomy (); other (2005); and stapedectomy (2009).  History reviewed. No pertinent family history.  Family Status   Relation Name Status   • Mo  Alive   • Fa 67  at age 67        meloma   • Sis ##Sis1 Alive             Social History     Tobacco Use   • Smoking status: Never Smoker   • Smokeless tobacco: Never Used   Substance Use Topics   • Alcohol use: Yes     Comment: 2-3 per month   • Drug use: No     Social History     Social History Narrative   • Not on file       ROS   Other than what is mentioned in HPI or physical exam, there is no history of headaches, double vision or blurred vision. No temporal tenderness or jaw claudication.  No trouble swallowing difficulties or sore throats.  No chest complaints including chest pain, cough or sputum production. No GI complaints including nausea, vomiting, change in bowel habits, or past peptic ulcer disease. No history of blood in the stools. No urinary complaints including dysuria or frequency. No history of alopecia, photosensitivity, oral ulcerations, Raynaud's  "phenomena.       Objective:     BP (!) 90/50   Pulse 76   Temp 36.5 °C (97.7 °F) (Temporal)   Resp 12   Ht 1.6 m (5' 3\")   Wt 49.4 kg (109 lb)   SpO2 99%  Body mass index is 19.31 kg/m².  Physical Exam:    Constitutional: Alert and oriented X3, no distress.Skin: Warm, dry, good turgor, no rashes in visible areas, did not see any hidebound skin, no dactylitis no digital tip ulcerations, no telangiectasias on the face of the palms of the hands noted today.  Eye: Equal, round and reactive, conjunctiva clear, lids normal EOM intactENMT: Lips without lesions, good dentition, no oropharyngeal ulcers, moist buccal mucosa, pinna without deformityNeck: Trachea midline, no masses, no thyromegaly, patient with lymph node on the right jaw nontender to palpation.Lymph:  No cervical lymphadenopathy, no axillary lymphadenopathy, no supraclavicular lymphadenopathyRespiratory: Unlabored respiratory effort, lungs clear to auscultation, no wheezes, no ronchi, good sounds in all quadrants.Cardiovascular: Normal S1, S2, no murmur, no edema.Abdomen: Soft, non-tender, no masses, no hepatosplenomegaly.Psych: Alert and oriented x3, normal affect and mood.Neuro Cranial nerves 2-12 are grossly intact, no loss of sensation LEExt:no joint laxity noted in bilateral arms, no joint laxity noted in bilateral legs, no digital tip ulcerations no active Raynaud's no swan-neck but no deformities no dactylitis no sausage digits no swan-neck or boutonniere deformities, no flexion contractures no nodules no tophi    Lab Results   Component Value Date/Time    SODIUM 139 07/31/2020 11:16 AM    POTASSIUM 4.1 07/31/2020 11:16 AM    CHLORIDE 101 07/31/2020 11:16 AM    CO2 25 07/31/2020 11:16 AM    GLUCOSE 93 07/31/2020 11:16 AM    BUN 18 07/31/2020 11:16 AM    CREATININE 0.59 07/31/2020 11:16 AM      Lab Results   Component Value Date/Time    WBC 5.8 07/31/2020 11:16 AM    RBC 4.96 07/31/2020 11:16 AM    HEMOGLOBIN 14.5 07/31/2020 11:16 AM    HEMATOCRIT " 45.7 07/31/2020 11:16 AM    MCV 92.1 07/31/2020 11:16 AM    MCH 29.2 07/31/2020 11:16 AM    MCHC 31.7 (L) 07/31/2020 11:16 AM    MPV 10.9 07/31/2020 11:16 AM    NEUTSPOLYS 54.00 07/31/2020 11:16 AM    LYMPHOCYTES 35.50 07/31/2020 11:16 AM    MONOCYTES 7.50 07/31/2020 11:16 AM    EOSINOPHILS 1.60 07/31/2020 11:16 AM    BASOPHILS 1.20 07/31/2020 11:16 AM      Lab Results   Component Value Date/Time    CALCIUM 9.2 10/07/2020 05:20 PM    ASTSGOT 17 07/31/2020 11:16 AM    ALTSGPT 23 07/31/2020 11:16 AM    ALKPHOSPHAT 71 07/31/2020 11:16 AM    TBILIRUBIN 0.3 07/31/2020 11:16 AM    ALBUMIN 4.6 07/31/2020 11:16 AM    TOTPROTEIN 7.1 07/31/2020 11:16 AM     Lab Results   Component Value Date/Time    URICACID 3.5 07/23/2019 02:29 PM    ANTINUCAB Detected (A) 07/31/2020 11:16 AM     Lab Results   Component Value Date/Time    SEDRATEWES 4 07/31/2020 11:16 AM     Lab Results   Component Value Date/Time    COLORURINE Yellow 07/23/2019 02:29 PM    SPECGRAVITY <=1.005 07/23/2019 02:29 PM    PHURINE 6.0 07/23/2019 02:29 PM    GLUCOSEUR Negative 07/23/2019 02:29 PM    KETONES Trace (A) 07/23/2019 02:29 PM    PROTEINURIN Negative 07/23/2019 02:29 PM       Lab Results   Component Value Date/Time    HBA1C 5.3 07/31/2020 11:16 AM     Lab Results   Component Value Date/Time    TSHULTRASEN 1.540 07/31/2020 11:16 AM    FREET4 1.17 07/31/2020 11:16 AM     Lab Results   Component Value Date/Time    MICROSOMALA <9.0 07/31/2020 11:16 AM    ANTITHYROGL <0.9 07/31/2020 11:16 AM     Lab Results   Component Value Date/Time    ANTIMITOCHO 90.1 (H) 07/31/2020 11:16 AM     Lab Results   Component Value Date/Time    PTHINTACT 30.7 10/07/2020 05:20 PM     Results for orders placed during the hospital encounter of 08/12/20   DS-BONE DENSITY STUDY (DEXA)    Impression According to the World Health Organization classification, bone mineral density of this patient is osteoporosis with high fracture risk lumbar spine, osteopenia with increased fracture risk  left femur.        10-year Probability of Fracture:  Major Osteoporotic     13.0%  Hip     3.9%  Population      [place] ()    Based on left femur neck BMD          INTERPRETING LOCATION:  [address] [place] [address], [place], [id number]       Assessment and Plan:  1. CREST syndrome (HCC)  Currently on no medication, patient prefers to be on no medication, patient has not had PFTs for some time will order PFTs to assure no development of restrictive lung disease  Patient states she had an echocardiogram with Dr. Ornelas about a year and a half ago, patient to bring that in with her at next visit for evaluation  - PULMONARY FUNCTION TESTS -Test requested: Complete Pulmonary Function Test; Future    2. Rheumatoid factor positive  Next visit check status of hand and feet x-rays, patient has not had hand and feet x-rays in her records then we will need to do those for establishment of erosive arthritis or not patient may warrant additional treatment.    3. Raynaud's disease without gangrene  Patient had been offered calcium channel blockers for which patient opts out, patient's PCP did offer nitroglycerin ointment for the patient has not used    4. Hypothyroidism, unspecified type  Can exacerbate joint pain, I recommend monitoring thyroid on a regular basis to assure it is not contributing to the patient's joint pain    5. Stress incontinence  Patient scheduled to see urology for further evaluation    6) Osteoporosis  Recent bone density scan done August 2020 indicate osteoporosis both by T-scores and by FRAX score, discussed with patient patient refuses treatment at this time   continue calcium citrate 1200 mg by mouth daily and vitamin D about 2000 units by mouth daily and magnesium 200 mg by mouth daily    7) positive AMA/autoimmune hepatitis  Compatible with autoimmune hepatitis or sclerosing cholangitis, liver ultrasound negative thus far per patient report, already is followed by GI.    8)  lymphadenopthy  Right  lower jaw lymph node nontender palpation ongoing for about 6 months, CBC stable, no lymphocytosis consider possible lymph node biopsy will address in 4 weeks.    Records requested.  Followup: Return in about 4 weeks (around 11/18/2020). or sooner prn for 60-minute appointment    Patient was seen 60 minutes face-to-face (excluding time for procedures)  of which more than 50% the time was spent counseling the patient regarding  rheumatological conditions and care. Therapy was discussed in detail.  Thank you for this referral.    Please note that this dictation was created using voice recognition software. I have made every reasonable attempt to correct obvious errors, but I expect that there are errors of grammar and possibly content that I did not discover before finalizing the note.

## 2020-10-21 NOTE — ASSESSMENT & PLAN NOTE
This is a new patient evaluation  Patient here to get evaluated for RA and scleroderma and CREST initially dx about 10 years ago with symptoms of esophageal dysplasia with positive blood tests. Patient had symptoms about 5 years prior to symptoms. Patient then patient saw Dr Aguirre with [facility] who did more testing, patient was not started on any medications patient has been self treating with diet and supplements and has not been on any treatment.    Patient states positive AMA, s/p eval by GI and s/p liver scan still ok    S/p PFT's and cardiology Dr Molina, patient sees q 3 years    PMHx  Hypothyroid  Tonsillectomy  Ovarian cyst removed  Bilateral breast implants    Fam Hx  F passed from oral cancer, hx of testicular cancer  M-hypothyroid, fibromyalgia  SX1- ulcerative colitis    Soc Hx  No tob  ETOH q 2 months  No blood tx  No tattoos  NO IVDA    ANTHONY 1:160 6/2012 [facility]  Anti centromere >8.0 6/2012 [facility]  dsDNA neg 6/2012 [facility]  RNP neg 6/2012 [facility]  Smith neg 6/2012 [facility]  SCL-70 neg6/2012 [facility]  SSA neg 6/2012 [facility]  SSB neg 6/2012 [facility]  WILL-1 neg 6/2012 [facility]  C3 91 nl 6/2012 [facility]  C4 22 nl 6/2012 [facility]  CCP neg 6/2012 [facility]  RA 16.1 7/2015 [facility]

## 2020-10-25 LAB
MISCELLANEOUS LAB RESULT MISCLAB: NORMAL
MISCELLANEOUS LAB RESULT MISCLAB: NORMAL

## 2020-10-27 LAB
CANCELLED TEST NOTIFICATION CANTE: NORMAL
CANCELLED TEST NOTIFICATION CANTE: NORMAL

## 2020-10-28 ENCOUNTER — HOSPITAL ENCOUNTER (OUTPATIENT)
Dept: LAB | Facility: MEDICAL CENTER | Age: 55
End: 2020-10-28
Attending: FAMILY MEDICINE
Payer: COMMERCIAL

## 2020-10-28 PROCEDURE — 36415 COLL VENOUS BLD VENIPUNCTURE: CPT

## 2020-10-28 PROCEDURE — 86160 COMPLEMENT ANTIGEN: CPT

## 2020-11-09 LAB — MISCELLANEOUS LAB RESULT MISCLAB: NORMAL

## 2020-11-13 LAB — MISCELLANEOUS LAB RESULT MISCLAB: NORMAL

## 2020-12-02 ENCOUNTER — APPOINTMENT (OUTPATIENT)
Dept: RHEUMATOLOGY | Facility: MEDICAL CENTER | Age: 55
End: 2020-12-02
Payer: COMMERCIAL

## 2020-12-04 ENCOUNTER — HOSPITAL ENCOUNTER (OUTPATIENT)
Dept: LAB | Facility: MEDICAL CENTER | Age: 55
End: 2020-12-04
Attending: FAMILY MEDICINE
Payer: COMMERCIAL

## 2020-12-04 LAB
25(OH)D3 SERPL-MCNC: 73 NG/ML (ref 30–100)
ALBUMIN SERPL BCP-MCNC: 4 G/DL (ref 3.2–4.9)
ALBUMIN/GLOB SERPL: 1.5 G/DL
ALP SERPL-CCNC: 63 U/L (ref 30–99)
ALT SERPL-CCNC: 12 U/L (ref 2–50)
ANION GAP SERPL CALC-SCNC: 9 MMOL/L (ref 7–16)
AST SERPL-CCNC: 12 U/L (ref 12–45)
BASOPHILS # BLD AUTO: 1.2 % (ref 0–1.8)
BASOPHILS # BLD: 0.07 K/UL (ref 0–0.12)
BILIRUB SERPL-MCNC: 0.3 MG/DL (ref 0.1–1.5)
BUN SERPL-MCNC: 19 MG/DL (ref 8–22)
CALCIUM SERPL-MCNC: 9.2 MG/DL (ref 8.5–10.5)
CHLORIDE SERPL-SCNC: 103 MMOL/L (ref 96–112)
CHOLEST SERPL-MCNC: 176 MG/DL (ref 100–199)
CO2 SERPL-SCNC: 25 MMOL/L (ref 20–33)
CREAT SERPL-MCNC: 0.61 MG/DL (ref 0.5–1.4)
CRP SERPL HS-MCNC: 1.6 MG/L (ref 0–7.5)
DHEA-S SERPL-MCNC: 89.9 UG/DL (ref 18.9–205)
EOSINOPHIL # BLD AUTO: 0.16 K/UL (ref 0–0.51)
EOSINOPHIL NFR BLD: 2.7 % (ref 0–6.9)
ERYTHROCYTE [DISTWIDTH] IN BLOOD BY AUTOMATED COUNT: 44.5 FL (ref 35.9–50)
ESTRADIOL SERPL-MCNC: 106 PG/ML
FASTING STATUS PATIENT QL REPORTED: NORMAL
GLOBULIN SER CALC-MCNC: 2.6 G/DL (ref 1.9–3.5)
GLUCOSE SERPL-MCNC: 91 MG/DL (ref 65–99)
HCT VFR BLD AUTO: 42.9 % (ref 37–47)
HCYS SERPL-SCNC: 5.87 UMOL/L
HDLC SERPL-MCNC: 82 MG/DL
HGB BLD-MCNC: 14 G/DL (ref 12–16)
IMM GRANULOCYTES # BLD AUTO: 0.01 K/UL (ref 0–0.11)
IMM GRANULOCYTES NFR BLD AUTO: 0.2 % (ref 0–0.9)
LDLC SERPL CALC-MCNC: 85 MG/DL
LYMPHOCYTES # BLD AUTO: 1.58 K/UL (ref 1–4.8)
LYMPHOCYTES NFR BLD: 27.1 % (ref 22–41)
MCH RBC QN AUTO: 29.4 PG (ref 27–33)
MCHC RBC AUTO-ENTMCNC: 32.6 G/DL (ref 33.6–35)
MCV RBC AUTO: 90.1 FL (ref 81.4–97.8)
MONOCYTES # BLD AUTO: 0.55 K/UL (ref 0–0.85)
MONOCYTES NFR BLD AUTO: 9.5 % (ref 0–13.4)
NEUTROPHILS # BLD AUTO: 3.45 K/UL (ref 2–7.15)
NEUTROPHILS NFR BLD: 59.3 % (ref 44–72)
NRBC # BLD AUTO: 0 K/UL
NRBC BLD-RTO: 0 /100 WBC
PLATELET # BLD AUTO: 373 K/UL (ref 164–446)
PMV BLD AUTO: 9.2 FL (ref 9–12.9)
POTASSIUM SERPL-SCNC: 4.1 MMOL/L (ref 3.6–5.5)
PROGEST SERPL-MCNC: 1.58 NG/ML
PROT SERPL-MCNC: 6.6 G/DL (ref 6–8.2)
RBC # BLD AUTO: 4.76 M/UL (ref 4.2–5.4)
SODIUM SERPL-SCNC: 137 MMOL/L (ref 135–145)
T3FREE SERPL-MCNC: 2.46 PG/ML (ref 2–4.4)
THYROPEROXIDASE AB SERPL-ACNC: <9 IU/ML (ref 0–9)
TRIGL SERPL-MCNC: 45 MG/DL (ref 0–149)
TSH SERPL DL<=0.005 MIU/L-ACNC: 0.01 UIU/ML (ref 0.38–5.33)
WBC # BLD AUTO: 5.8 K/UL (ref 4.8–10.8)

## 2020-12-04 PROCEDURE — 86141 C-REACTIVE PROTEIN HS: CPT

## 2020-12-04 PROCEDURE — 84481 FREE ASSAY (FT-3): CPT

## 2020-12-04 PROCEDURE — 86376 MICROSOMAL ANTIBODY EACH: CPT

## 2020-12-04 PROCEDURE — 84270 ASSAY OF SEX HORMONE GLOBUL: CPT

## 2020-12-04 PROCEDURE — 82627 DEHYDROEPIANDROSTERONE: CPT

## 2020-12-04 PROCEDURE — 84403 ASSAY OF TOTAL TESTOSTERONE: CPT

## 2020-12-04 PROCEDURE — 83090 ASSAY OF HOMOCYSTEINE: CPT

## 2020-12-04 PROCEDURE — 80061 LIPID PANEL: CPT

## 2020-12-04 PROCEDURE — 85025 COMPLETE CBC W/AUTO DIFF WBC: CPT

## 2020-12-04 PROCEDURE — 84443 ASSAY THYROID STIM HORMONE: CPT

## 2020-12-04 PROCEDURE — 36415 COLL VENOUS BLD VENIPUNCTURE: CPT

## 2020-12-04 PROCEDURE — 86800 THYROGLOBULIN ANTIBODY: CPT

## 2020-12-04 PROCEDURE — 80053 COMPREHEN METABOLIC PANEL: CPT

## 2020-12-04 PROCEDURE — 82306 VITAMIN D 25 HYDROXY: CPT

## 2020-12-04 PROCEDURE — 82679 ASSAY OF ESTRONE: CPT

## 2020-12-04 PROCEDURE — 86663 EPSTEIN-BARR ANTIBODY: CPT

## 2020-12-04 PROCEDURE — 84482 T3 REVERSE: CPT

## 2020-12-04 PROCEDURE — 84402 ASSAY OF FREE TESTOSTERONE: CPT

## 2020-12-04 PROCEDURE — 84305 ASSAY OF SOMATOMEDIN: CPT

## 2020-12-04 PROCEDURE — 86665 EPSTEIN-BARR CAPSID VCA: CPT | Mod: 91

## 2020-12-04 PROCEDURE — 84144 ASSAY OF PROGESTERONE: CPT

## 2020-12-04 PROCEDURE — 86664 EPSTEIN-BARR NUCLEAR ANTIGEN: CPT

## 2020-12-04 PROCEDURE — 82670 ASSAY OF TOTAL ESTRADIOL: CPT

## 2020-12-06 LAB
EBV EA-D IGG SER-ACNC: 18.4 U/ML (ref 0–10.9)
EBV NA IGG SER IA-ACNC: 81.7 U/ML (ref 0–21.9)
EBV VCA IGG SER IA-ACNC: 175 U/ML (ref 0–21.9)
EBV VCA IGM SER IA-ACNC: <10 U/ML (ref 0–43.9)

## 2020-12-07 LAB — THYROGLOB AB SERPL-ACNC: <0.9 IU/ML (ref 0–4)

## 2020-12-08 LAB
ESTRONE SERPL-MCNC: 190 PG/ML
IGF-I SERPL-MCNC: 99 NG/ML (ref 48–235)
IGF-I Z-SCORE SERPL: -0.4

## 2020-12-09 ENCOUNTER — HOSPITAL ENCOUNTER (OUTPATIENT)
Dept: PULMONOLOGY | Facility: MEDICAL CENTER | Age: 55
End: 2020-12-09
Attending: INTERNAL MEDICINE
Payer: COMMERCIAL

## 2020-12-09 DIAGNOSIS — M34.1 CREST SYNDROME (HCC): ICD-10-CM

## 2020-12-09 LAB — T3REVERSE SERPL-MCNC: 22 NG/DL (ref 9–27)

## 2020-12-09 PROCEDURE — 94726 PLETHYSMOGRAPHY LUNG VOLUMES: CPT

## 2020-12-09 PROCEDURE — 94729 DIFFUSING CAPACITY: CPT | Mod: 26 | Performed by: INTERNAL MEDICINE

## 2020-12-09 PROCEDURE — 94726 PLETHYSMOGRAPHY LUNG VOLUMES: CPT | Mod: 26 | Performed by: INTERNAL MEDICINE

## 2020-12-09 PROCEDURE — 94010 BREATHING CAPACITY TEST: CPT

## 2020-12-09 PROCEDURE — 94729 DIFFUSING CAPACITY: CPT

## 2020-12-09 ASSESSMENT — PULMONARY FUNCTION TESTS
FEV1/FVC_PERCENT_PREDICTED: 101
FEV1/FVC: 81
FEV1/FVC: 81
FEV1/FVC_PREDICTED: 79.46
FEV1/FVC_PERCENT_LLN: 66.35
FEV1/FVC_PERCENT_PREDICTED: 80
FVC_PREDICTED: 3.72
FEV1_PERCENT_PREDICTED: 99
FVC_PERCENT_PREDICTED: 97
FVC_LLN: 3.10
FEV1/FVC_PERCENT_PREDICTED: 102
FEV1_LLN: 2.47
FEV1: 2.93
FVC: 3.62
FEV1_PREDICTED: 2.96

## 2020-12-10 LAB
SHBG SERPL-SCNC: 194 NMOL/L (ref 30–135)
TESTOST FREE SERPL-MCNC: 4.2 PG/ML (ref 0.6–3.8)
TESTOST SERPL-MCNC: 92 NG/DL (ref 9–55)

## 2020-12-14 ENCOUNTER — OFFICE VISIT (OUTPATIENT)
Dept: RHEUMATOLOGY | Facility: MEDICAL CENTER | Age: 55
End: 2020-12-14
Payer: COMMERCIAL

## 2020-12-14 VITALS
HEART RATE: 102 BPM | OXYGEN SATURATION: 98 % | RESPIRATION RATE: 14 BRPM | SYSTOLIC BLOOD PRESSURE: 102 MMHG | DIASTOLIC BLOOD PRESSURE: 58 MMHG | BODY MASS INDEX: 19.31 KG/M2 | TEMPERATURE: 97.4 F | WEIGHT: 109 LBS

## 2020-12-14 DIAGNOSIS — R76.8 RHEUMATOID FACTOR POSITIVE: ICD-10-CM

## 2020-12-14 DIAGNOSIS — M34.1 CREST SYNDROME (HCC): ICD-10-CM

## 2020-12-14 DIAGNOSIS — R59.1 LYMPHADENOPATHY: ICD-10-CM

## 2020-12-14 DIAGNOSIS — M81.0 OSTEOPOROSIS, UNSPECIFIED OSTEOPOROSIS TYPE, UNSPECIFIED PATHOLOGICAL FRACTURE PRESENCE: ICD-10-CM

## 2020-12-14 DIAGNOSIS — I73.00 RAYNAUD'S DISEASE WITHOUT GANGRENE: ICD-10-CM

## 2020-12-14 DIAGNOSIS — K75.4 AUTOIMMUNE HEPATITIS (HCC): ICD-10-CM

## 2020-12-14 DIAGNOSIS — E03.9 HYPOTHYROIDISM, UNSPECIFIED TYPE: ICD-10-CM

## 2020-12-14 PROCEDURE — 99214 OFFICE O/P EST MOD 30 MIN: CPT | Performed by: INTERNAL MEDICINE

## 2020-12-14 ASSESSMENT — FIBROSIS 4 INDEX: FIB4 SCORE: 0.52

## 2020-12-14 NOTE — PROCEDURES
DATE OF SERVICE:  12/09/2020     PULMONARY FUNCTION TEST INTERPRETATION     The patient had pulmonary function testing done on 12/09/2020.  The pulmonary   function testing showed an FEV1/FVC ratio of 81% predicted, which is normal   and does not represent any evidence of obstruction.  The total lung capacity   is also normal at 112% predicted, not consistent with any restrictive lung   disease.  The diffusion capacity is normal at 122% predicted, which is also   normal.     In summary, the pulmonary function testing including spirometry, the   flow-volume loops, the lung volumes, and the diffusion capacity are all within   normal limits.           ______________________________  MD NEHEMIAS Dhaliwal/SHERITA/DEMETRIO    DD:  12/13/2020 16:01  DT:  12/13/2020 16:13    Job#:  [id number]

## 2020-12-15 NOTE — PROGRESS NOTES
Chief Complaint- joint pain     Subjective:   Yaneli Gaspar is a 55 y.o. female here today for follow up of rheumatological issues     This is a follow-up visit for this patient, second visit with us referred for evaluation of a past diagnosis of scleroderma/crest/rheumatoid arthritis.  Patient states she was diagnosed about 2010 patient states she has had symptoms about 5 years prior to that with symptoms of esophageal dysplasia requiring corticosteroids to help calm down esophageal spasms.  Patient states she has never had any strictures or any esophageal dilation.  Since last visit patient status post PFTs which were normal range December 2020.  Since last visit patient has had some Raynaud's activity, has topical nitroglycerin ointment that she does not use, and prefers not to be on any medications for treatment.  Patient denies any digital tip ulcerations     Patient states that she is followed by Dr. Molina in cardiology, follows Dr. Ornelas every 3 years last echocardiogram about 1 year ago and normal per patient report.  Patient denies any shortness of breath, does get some mild Raynaud's but no digital tip ulcerations, does get occasional esophageal dysplasia but denies any aspiration, denies any calcinosis denies any hidebound skin.     Patient states positive AMA, s/p eval by GI and s/p liver scan still ok per patient report    Patient also with osteoporosis by DEXA scan August 2020 for which patient does not want any treatment.     S/p PFT's and cardiology Dr Molina, patient sees q 3 years     On physical exam also noted lymph node has been ongoing for about 6 months right lower jaw, patient already evaluated by ENT  who recommended further evaluation for which patient refused.     Other issues patient also complains of stress incontinence currently followed by urology.       Patient also complains of her hair hurting and skin hurting today as well, of note patient's mother carries a  diagnosis of fibromyalgia.   Patient also states that she has a past history of EBV infection           PMHx  Hypothyroid  Tonsillectomy  Ovarian cyst removed  Bilateral breast implants     Fam Hx  F passed from oral cancer, hx of testicular cancer  M-hypothyroid, fibromyalgia  SX1- ulcerative colitis  Daughter-celiac disease     Soc Hx  No tob  ETOH q 2 months  No blood tx  No tattoos  NO IVDA     ANTHONY 1:160 6/2012 [facility]  Anti centromere >8.0 6/2012 [facility]  AMA 96.6 1/2020; AMA 90.1 8/2020  dsDNA neg 6/2012 [facility]  RNP neg 6/2012 [facility]  Smith neg 6/2012 [facility]  SCL-70 neg6/2012 [facility]  SSA neg 6/2012 [facility]  SSB neg 6/2012 [facility]  WILL-1 neg 6/2012 [facility]  C3 91 nl 6/2012 [facility]  C4 22 nl 6/2012 [facility]  CCP neg 6/2012 [facility]  RA 16.1 7/2015 [facility]  Gliadin ab neg 10/2020  TPO neg 7/2020  Thyroglobulin ab neg 7/2020  DEXA 8/12/2020 T scores -3.2, -2.4  FRAX 8/12/2020 major osteoporotic fracture risk 13.0%, hip fracture risk 3.9%     DATE OF SERVICE:  12/09/2020   PULMONARY FUNCTION TEST INTERPRETATION  The patient had pulmonary function testing done on 12/09/2020.  The pulmonary   function testing showed an FEV1/FVC ratio of 81% predicted, which is normal   and does not represent any evidence of obstruction.  The total lung capacity   is also normal at 112% predicted, not consistent with any restrictive lung   disease.  The diffusion capacity is normal at 122% predicted, which is also   normal.  In summary, the pulmonary function testing including spirometry, the   flow-volume loops, the lung volumes, and the diffusion capacity are all within   normal limits.        Current medicines (including changes today)  Current Outpatient Medications   Medication Sig Dispense Refill   • NALTREXONE HCL PO Take  by mouth. LDN     • progesterone (PROMETRIUM) 100 MG CAPS Take 100 mg by mouth every day.     • Testosterone 10 MG/ACT (2%) GEL Apply  to skin as directed.     • METHYLCOBALAMIN 1 mg by Does not apply route.      • Potassium 99 MG TABS Take  by mouth.     • Probiotic Product (SOLUBLE FIBER/PROBIOTICS PO) Take  by mouth.     • MAGNESIUM ACETATE by Does not apply route.     • THYROID PO Take  by mouth. COMPOUND T3 T4 1.5 GRAIN DAILY      • Multiple Vitamin (DAILY MULTIVITAMIN) TABS Take  by mouth.     • VITAMIN D PO Take  by mouth.     • Ascorbic Acid (VITAMIN C PO) Take  by mouth.     • FISH OIL by Does not apply route.     • Methylsulfonylmethane (MSM PO) Take  by mouth.     • Transdermal Base CREA 2.5 mg by Apply externally route.     • NS SOLN 50 mL with folic acid 5 MG/ML SOLN 10 mg 10 mg by Intravenous route Once. 50 mL    • epinephrine (ADRENALIN) 0.1 MG/ML SOLN 2 mg by Injection route Once.     • Apoaequorin (PREVAGEN PO) Take 40 mg by mouth.       No current facility-administered medications for this visit.      She  has a past medical history of Chronic fatigue, Infectious disease, Raynauds syndrome, Snoring, Unspecified disorder of thyroid, and Urinary bladder disorder.    ROS   Other than what is mentioned in HPI or physical exam, there is no history of headaches, double vision or blurred vision. No temporal tenderness or jaw claudication. No trouble swallowing difficulties or sore throats.  No chest complaints including chest pain, cough or sputum production. No GI complaints including nausea, vomiting, change in bowel habits, or past peptic ulcer disease. No history of blood in the stools. No urinary complaints including dysuria or frequency. No history of alopecia, photosensitivity, oral ulcerations, Raynaud's phenomena.       Objective:     /58   Pulse (!) 102   Temp 36.3 °C (97.4 °F) (Temporal)   Resp 14   Wt 49.4 kg (109 lb)   SpO2 98%  Body mass index is 19.31 kg/m².   Physical Exam:    Constitutional: Alert and oriented X3, patient is talkative with good eye contact.Skin: Warm, dry, good turgor, no rashes in visible areas.Eye: Equal, round and reactive, conjunctiva clear, lids normal EOM  intactENMT: Lips without lesions, good dentition, no oropharyngeal ulcers, moist buccal mucosa, pinna without deformityNeck: Trachea midline, no masses, no thyromegaly.Lymph:  No cervical lymphadenopathy, no axillary lymphadenopathy, no supraclavicular lymphadenopathy, patient does have palpable lymph node right lower jaw nontender to palpation respiratory: Unlabored respiratory effort, lungs clear to auscultation, no wheezes, no ronchi.Cardiovascular: Normal S1, S2, no murmur, no edema.Abdomen: Soft, non-tender, no masses, no hepatosplenomegaly.Psych: Alert and oriented x3, normal affect and mood.Neuro: Cranial nerves 2-12 are grossly intact, no loss of sensation LEExt:no joint laxity noted in bilateral arms, no joint laxity noted in bilateral legs, no sclerodactyly no digital tip ulcerations no periungual erythema no splinter hemorrhages neither in fingers or toes    Lab Results   Component Value Date/Time    SODIUM 137 12/04/2020 09:44 AM    POTASSIUM 4.1 12/04/2020 09:44 AM    CHLORIDE 103 12/04/2020 09:44 AM    CO2 25 12/04/2020 09:44 AM    GLUCOSE 91 12/04/2020 09:44 AM    BUN 19 12/04/2020 09:44 AM    CREATININE 0.61 12/04/2020 09:44 AM      Lab Results   Component Value Date/Time    WBC 5.8 12/04/2020 09:44 AM    RBC 4.76 12/04/2020 09:44 AM    HEMOGLOBIN 14.0 12/04/2020 09:44 AM    HEMATOCRIT 42.9 12/04/2020 09:44 AM    MCV 90.1 12/04/2020 09:44 AM    MCH 29.4 12/04/2020 09:44 AM    MCHC 32.6 (L) 12/04/2020 09:44 AM    MPV 9.2 12/04/2020 09:44 AM    NEUTSPOLYS 59.30 12/04/2020 09:44 AM    LYMPHOCYTES 27.10 12/04/2020 09:44 AM    MONOCYTES 9.50 12/04/2020 09:44 AM    EOSINOPHILS 2.70 12/04/2020 09:44 AM    BASOPHILS 1.20 12/04/2020 09:44 AM      Lab Results   Component Value Date/Time    CALCIUM 9.2 12/04/2020 09:44 AM    ASTSGOT 12 12/04/2020 09:44 AM    ALTSGPT 12 12/04/2020 09:44 AM    ALKPHOSPHAT 63 12/04/2020 09:44 AM    TBILIRUBIN 0.3 12/04/2020 09:44 AM    ALBUMIN 4.0 12/04/2020 09:44 AM    TOTPROTEIN  6.6 12/04/2020 09:44 AM     Lab Results   Component Value Date/Time    URICACID 3.5 07/23/2019 02:29 PM    ANTINUCAB Detected (A) 07/31/2020 11:16 AM     Lab Results   Component Value Date/Time    SEDRATEWES 4 07/31/2020 11:16 AM     Lab Results   Component Value Date/Time    HBA1C 5.3 07/31/2020 11:16 AM     Lab Results   Component Value Date/Time    MICROSOMALA <9.0 12/04/2020 09:44 AM    ANTITHYROGL <0.9 12/04/2020 09:44 AM     Lab Results   Component Value Date/Time    ANTIMITOCHO 90.1 (H) 07/31/2020 11:16 AM     Lab Results   Component Value Date/Time    PTHINTACT 30.7 10/07/2020 05:20 PM     Results for orders placed during the hospital encounter of 08/12/20   DS-BONE DENSITY STUDY (DEXA)    Impression According to the World Health Organization classification, bone mineral density of this patient is osteoporosis with high fracture risk lumbar spine, osteopenia with increased fracture risk left femur.        10-year Probability of Fracture:  Major Osteoporotic     13.0%  Hip     3.9%  Population      [place] ()    Based on left femur neck BMD          INTERPRETING LOCATION:  [address] [place] [address], [place], [id number]      Assessment and Plan:     1. CREST syndrome (HCC)  With manifestations of esophageal spasm not necessarily dysmotility, and Raynaud's, patient prefers not to be on any management at this time, patient does have nitroglycerin ointment to be used on her fingers as needed.  Of note last PFTs December 2020 was normal  - DX-JOINT SURVEY-HANDS SINGLE VIEW; Future  - DX-JOINT SURVEY-FEET SINGLE VIEW; Future    2. Rheumatoid factor positive  Low positive, CCP negative, today do bilateral hand and feet x-rays for evaluation of erosive arthritis versus DJD  - DX-JOINT SURVEY-HANDS SINGLE VIEW; Future  - DX-JOINT SURVEY-FEET SINGLE VIEW; Future    3. Raynaud's disease without gangrene  Uses topical nitroglycerin as needed    4. Hypothyroidism, unspecified type  Can exacerbate joint pain, I recommend  monitoring thyroid on a regular basis to assure it is not contributing to the patient's joint pain    5. Osteoporosis, unspecified osteoporosis type, unspecified pathological fracture presence  As seen on bone density scan August 2020 patient not interested in any management at this time   continue calcium citrate 1200 mg by mouth daily and vitamin D about 2000 units by mouth daily and magnesium 200 mg by mouth daily    6. Autoimmune hepatitis (HCC)  With positive AMA but normal liver functions continue to monitor    7. Lymphadenopathy  Already followed by ENT     Followup: Return in about 6 months (around 6/14/2021). or sooner fabiola Gaspar  was seen 30 minutes face-to-face of which more than 50% of the time was spent counseling the patient (excluding time for procedures)  regarding  rheumatological condition and care. Therapy was discussed in detail.      Please note that this dictation was created using voice recognition software. I have made every reasonable attempt to correct obvious errors, but I expect that there are errors of grammar and possibly content that I did not discover before finalizing the note.

## 2020-12-17 ENCOUNTER — HOSPITAL ENCOUNTER (OUTPATIENT)
Dept: RADIOLOGY | Facility: MEDICAL CENTER | Age: 55
End: 2020-12-17
Attending: FAMILY MEDICINE
Payer: COMMERCIAL

## 2020-12-17 DIAGNOSIS — R22.1 NECK MASS: ICD-10-CM

## 2020-12-17 PROCEDURE — 76536 US EXAM OF HEAD AND NECK: CPT

## 2020-12-27 ENCOUNTER — HOSPITAL ENCOUNTER (OUTPATIENT)
Dept: RADIOLOGY | Facility: MEDICAL CENTER | Age: 55
End: 2020-12-27
Attending: FAMILY MEDICINE
Payer: COMMERCIAL

## 2020-12-27 DIAGNOSIS — R91.8 LUNG MASS: ICD-10-CM

## 2020-12-27 DIAGNOSIS — R22.1 MASS OF NECK: ICD-10-CM

## 2020-12-27 DIAGNOSIS — R93.0 FAMILIAL ENLARGEMENT OF THE SELLA TURCICA: ICD-10-CM

## 2020-12-27 PROCEDURE — 700117 HCHG RX CONTRAST REV CODE 255: Performed by: FAMILY MEDICINE

## 2020-12-27 PROCEDURE — 71552 MRI CHEST W/O & W/DYE: CPT

## 2020-12-27 PROCEDURE — 70553 MRI BRAIN STEM W/O & W/DYE: CPT

## 2020-12-27 PROCEDURE — A9576 INJ PROHANCE MULTIPACK: HCPCS | Performed by: FAMILY MEDICINE

## 2020-12-27 PROCEDURE — 70543 MRI ORBT/FAC/NCK W/O &W/DYE: CPT

## 2020-12-27 RX ADMIN — GADOTERIDOL 10 ML: 279.3 INJECTION, SOLUTION INTRAVENOUS at 18:39

## 2021-02-02 ENCOUNTER — HOSPITAL ENCOUNTER (OUTPATIENT)
Dept: LAB | Facility: MEDICAL CENTER | Age: 56
End: 2021-02-02
Attending: FAMILY MEDICINE
Payer: COMMERCIAL

## 2021-02-02 LAB
COVID ORDER STATUS COVID19: NORMAL
SARS-COV-2 RNA RESP QL NAA+PROBE: NOTDETECTED
SPECIMEN SOURCE: NORMAL

## 2021-02-02 PROCEDURE — C9803 HOPD COVID-19 SPEC COLLECT: HCPCS

## 2021-02-02 PROCEDURE — U0003 INFECTIOUS AGENT DETECTION BY NUCLEIC ACID (DNA OR RNA); SEVERE ACUTE RESPIRATORY SYNDROME CORONAVIRUS 2 (SARS-COV-2) (CORONAVIRUS DISEASE [COVID-19]), AMPLIFIED PROBE TECHNIQUE, MAKING USE OF HIGH THROUGHPUT TECHNOLOGIES AS DESCRIBED BY CMS-2020-01-R: HCPCS

## 2021-02-02 PROCEDURE — U0005 INFEC AGEN DETEC AMPLI PROBE: HCPCS

## 2021-02-09 ENCOUNTER — HOSPITAL ENCOUNTER (OUTPATIENT)
Dept: LAB | Facility: MEDICAL CENTER | Age: 56
End: 2021-02-09
Attending: FAMILY MEDICINE
Payer: COMMERCIAL

## 2021-02-09 PROCEDURE — U0005 INFEC AGEN DETEC AMPLI PROBE: HCPCS

## 2021-02-09 PROCEDURE — U0003 INFECTIOUS AGENT DETECTION BY NUCLEIC ACID (DNA OR RNA); SEVERE ACUTE RESPIRATORY SYNDROME CORONAVIRUS 2 (SARS-COV-2) (CORONAVIRUS DISEASE [COVID-19]), AMPLIFIED PROBE TECHNIQUE, MAKING USE OF HIGH THROUGHPUT TECHNOLOGIES AS DESCRIBED BY CMS-2020-01-R: HCPCS

## 2021-02-09 PROCEDURE — C9803 HOPD COVID-19 SPEC COLLECT: HCPCS

## 2021-02-11 ENCOUNTER — HOSPITAL ENCOUNTER (OUTPATIENT)
Dept: LAB | Facility: MEDICAL CENTER | Age: 56
End: 2021-02-11
Attending: FAMILY MEDICINE
Payer: COMMERCIAL

## 2021-02-11 PROCEDURE — 36415 COLL VENOUS BLD VENIPUNCTURE: CPT

## 2021-02-11 PROCEDURE — 84482 T3 REVERSE: CPT

## 2021-02-11 PROCEDURE — 84481 FREE ASSAY (FT-3): CPT

## 2021-02-11 PROCEDURE — 86376 MICROSOMAL ANTIBODY EACH: CPT

## 2021-02-11 PROCEDURE — 84443 ASSAY THYROID STIM HORMONE: CPT

## 2021-02-11 PROCEDURE — 86800 THYROGLOBULIN ANTIBODY: CPT

## 2021-02-11 PROCEDURE — 84439 ASSAY OF FREE THYROXINE: CPT

## 2021-02-12 LAB
T3FREE SERPL-MCNC: 4.1 PG/ML (ref 2–4.4)
T4 FREE SERPL-MCNC: 0.88 NG/DL (ref 0.93–1.7)
THYROPEROXIDASE AB SERPL-ACNC: <9 IU/ML (ref 0–9)
TSH SERPL DL<=0.005 MIU/L-ACNC: 1.85 UIU/ML (ref 0.38–5.33)

## 2021-02-16 ENCOUNTER — HOSPITAL ENCOUNTER (OUTPATIENT)
Dept: LAB | Facility: MEDICAL CENTER | Age: 56
End: 2021-02-16
Attending: FAMILY MEDICINE
Payer: COMMERCIAL

## 2021-02-16 PROCEDURE — U0003 INFECTIOUS AGENT DETECTION BY NUCLEIC ACID (DNA OR RNA); SEVERE ACUTE RESPIRATORY SYNDROME CORONAVIRUS 2 (SARS-COV-2) (CORONAVIRUS DISEASE [COVID-19]), AMPLIFIED PROBE TECHNIQUE, MAKING USE OF HIGH THROUGHPUT TECHNOLOGIES AS DESCRIBED BY CMS-2020-01-R: HCPCS

## 2021-02-16 PROCEDURE — C9803 HOPD COVID-19 SPEC COLLECT: HCPCS

## 2021-02-16 PROCEDURE — U0005 INFEC AGEN DETEC AMPLI PROBE: HCPCS

## 2021-02-25 ENCOUNTER — HOSPITAL ENCOUNTER (OUTPATIENT)
Dept: LAB | Facility: MEDICAL CENTER | Age: 56
End: 2021-02-25
Attending: FAMILY MEDICINE
Payer: COMMERCIAL

## 2021-02-25 PROCEDURE — C9803 HOPD COVID-19 SPEC COLLECT: HCPCS

## 2021-02-25 PROCEDURE — U0003 INFECTIOUS AGENT DETECTION BY NUCLEIC ACID (DNA OR RNA); SEVERE ACUTE RESPIRATORY SYNDROME CORONAVIRUS 2 (SARS-COV-2) (CORONAVIRUS DISEASE [COVID-19]), AMPLIFIED PROBE TECHNIQUE, MAKING USE OF HIGH THROUGHPUT TECHNOLOGIES AS DESCRIBED BY CMS-2020-01-R: HCPCS

## 2021-02-25 PROCEDURE — U0005 INFEC AGEN DETEC AMPLI PROBE: HCPCS

## 2021-03-02 LAB — THYROGLOB AB SERPL-ACNC: <0.9 IU/ML (ref 0–4)

## 2021-03-03 ENCOUNTER — HOSPITAL ENCOUNTER (OUTPATIENT)
Dept: LAB | Facility: MEDICAL CENTER | Age: 56
End: 2021-03-03
Attending: FAMILY MEDICINE
Payer: COMMERCIAL

## 2021-03-03 LAB
COVID ORDER STATUS COVID19: NORMAL
SARS-COV-2 RNA RESP QL NAA+PROBE: NOTDETECTED
SPECIMEN SOURCE: NORMAL
T3REVERSE SERPL-MCNC: 15.2 NG/DL (ref 9–27)

## 2021-03-03 PROCEDURE — U0005 INFEC AGEN DETEC AMPLI PROBE: HCPCS

## 2021-03-03 PROCEDURE — U0003 INFECTIOUS AGENT DETECTION BY NUCLEIC ACID (DNA OR RNA); SEVERE ACUTE RESPIRATORY SYNDROME CORONAVIRUS 2 (SARS-COV-2) (CORONAVIRUS DISEASE [COVID-19]), AMPLIFIED PROBE TECHNIQUE, MAKING USE OF HIGH THROUGHPUT TECHNOLOGIES AS DESCRIBED BY CMS-2020-01-R: HCPCS

## 2021-03-03 PROCEDURE — C9803 HOPD COVID-19 SPEC COLLECT: HCPCS

## 2021-03-17 ENCOUNTER — HOSPITAL ENCOUNTER (OUTPATIENT)
Dept: LAB | Facility: MEDICAL CENTER | Age: 56
End: 2021-03-17
Attending: FAMILY MEDICINE
Payer: COMMERCIAL

## 2021-03-17 PROCEDURE — C9803 HOPD COVID-19 SPEC COLLECT: HCPCS

## 2021-03-17 PROCEDURE — U0005 INFEC AGEN DETEC AMPLI PROBE: HCPCS

## 2021-03-17 PROCEDURE — U0003 INFECTIOUS AGENT DETECTION BY NUCLEIC ACID (DNA OR RNA); SEVERE ACUTE RESPIRATORY SYNDROME CORONAVIRUS 2 (SARS-COV-2) (CORONAVIRUS DISEASE [COVID-19]), AMPLIFIED PROBE TECHNIQUE, MAKING USE OF HIGH THROUGHPUT TECHNOLOGIES AS DESCRIBED BY CMS-2020-01-R: HCPCS

## 2021-03-30 ENCOUNTER — HOSPITAL ENCOUNTER (OUTPATIENT)
Dept: LAB | Facility: MEDICAL CENTER | Age: 56
End: 2021-03-30
Attending: FAMILY MEDICINE
Payer: COMMERCIAL

## 2021-03-30 PROCEDURE — U0003 INFECTIOUS AGENT DETECTION BY NUCLEIC ACID (DNA OR RNA); SEVERE ACUTE RESPIRATORY SYNDROME CORONAVIRUS 2 (SARS-COV-2) (CORONAVIRUS DISEASE [COVID-19]), AMPLIFIED PROBE TECHNIQUE, MAKING USE OF HIGH THROUGHPUT TECHNOLOGIES AS DESCRIBED BY CMS-2020-01-R: HCPCS

## 2021-03-30 PROCEDURE — C9803 HOPD COVID-19 SPEC COLLECT: HCPCS

## 2021-03-30 PROCEDURE — U0005 INFEC AGEN DETEC AMPLI PROBE: HCPCS

## 2021-04-13 ENCOUNTER — HOSPITAL ENCOUNTER (OUTPATIENT)
Dept: LAB | Facility: MEDICAL CENTER | Age: 56
End: 2021-04-13
Attending: FAMILY MEDICINE
Payer: COMMERCIAL

## 2021-04-13 PROCEDURE — U0003 INFECTIOUS AGENT DETECTION BY NUCLEIC ACID (DNA OR RNA); SEVERE ACUTE RESPIRATORY SYNDROME CORONAVIRUS 2 (SARS-COV-2) (CORONAVIRUS DISEASE [COVID-19]), AMPLIFIED PROBE TECHNIQUE, MAKING USE OF HIGH THROUGHPUT TECHNOLOGIES AS DESCRIBED BY CMS-2020-01-R: HCPCS

## 2021-04-13 PROCEDURE — U0005 INFEC AGEN DETEC AMPLI PROBE: HCPCS

## 2021-04-13 PROCEDURE — C9803 HOPD COVID-19 SPEC COLLECT: HCPCS

## 2021-04-27 ENCOUNTER — HOSPITAL ENCOUNTER (OUTPATIENT)
Dept: LAB | Facility: MEDICAL CENTER | Age: 56
End: 2021-04-27
Attending: FAMILY MEDICINE
Payer: COMMERCIAL

## 2021-04-27 PROCEDURE — C9803 HOPD COVID-19 SPEC COLLECT: HCPCS

## 2021-04-27 PROCEDURE — U0005 INFEC AGEN DETEC AMPLI PROBE: HCPCS

## 2021-04-27 PROCEDURE — U0003 INFECTIOUS AGENT DETECTION BY NUCLEIC ACID (DNA OR RNA); SEVERE ACUTE RESPIRATORY SYNDROME CORONAVIRUS 2 (SARS-COV-2) (CORONAVIRUS DISEASE [COVID-19]), AMPLIFIED PROBE TECHNIQUE, MAKING USE OF HIGH THROUGHPUT TECHNOLOGIES AS DESCRIBED BY CMS-2020-01-R: HCPCS

## 2021-06-14 ENCOUNTER — OFFICE VISIT (OUTPATIENT)
Dept: RHEUMATOLOGY | Facility: MEDICAL CENTER | Age: 56
End: 2021-06-14
Payer: COMMERCIAL

## 2021-06-14 VITALS
TEMPERATURE: 97.4 F | DIASTOLIC BLOOD PRESSURE: 60 MMHG | WEIGHT: 114 LBS | BODY MASS INDEX: 20.19 KG/M2 | HEART RATE: 76 BPM | SYSTOLIC BLOOD PRESSURE: 120 MMHG | RESPIRATION RATE: 14 BRPM | OXYGEN SATURATION: 97 %

## 2021-06-14 DIAGNOSIS — R76.8 RHEUMATOID FACTOR POSITIVE: ICD-10-CM

## 2021-06-14 DIAGNOSIS — M34.1 CREST SYNDROME (HCC): ICD-10-CM

## 2021-06-14 DIAGNOSIS — E03.9 HYPOTHYROIDISM, UNSPECIFIED TYPE: ICD-10-CM

## 2021-06-14 DIAGNOSIS — M81.0 OSTEOPOROSIS, UNSPECIFIED OSTEOPOROSIS TYPE, UNSPECIFIED PATHOLOGICAL FRACTURE PRESENCE: ICD-10-CM

## 2021-06-14 DIAGNOSIS — K75.4 AUTOIMMUNE HEPATITIS (HCC): ICD-10-CM

## 2021-06-14 DIAGNOSIS — I73.00 RAYNAUD'S DISEASE WITHOUT GANGRENE: ICD-10-CM

## 2021-06-14 PROCEDURE — 99214 OFFICE O/P EST MOD 30 MIN: CPT | Performed by: INTERNAL MEDICINE

## 2021-06-14 ASSESSMENT — FIBROSIS 4 INDEX: FIB4 SCORE: 0.53

## 2021-06-14 NOTE — PROGRESS NOTES
Chief Complaint- joint pain     Subjective:   Yaneli Gaspar is a 56 y.o. female here today for follow up of rheumatological issues    This is a follow-up visit for this patient, who works as a  physical therapist, third visit with us referred for evaluation of past diagnosis of scleroderma/crest/rheumatoid arthritis.  Patient states she was diagnosed about 2010 and that she has had symptoms for about 5 years prior to that with symptoms of esophageal dysplasia/esophageal spasms and also Raynaud's.  Patient currently not on any treatment, states her esophageal spasms have been quite good, patient does get Raynaud's but puts fingers in warm water or in her armpits and the Raynaud's resolves pretty quickly.  She is still not really interested in starting any treatment.            Patient states that she is followed by Dr. Molina in cardiology, follows Dr. Ornelas every 3 years last echocardiogram about December 2019  normal per patient report.  Patient denies any shortness of breath, does get some mild Raynaud's but no digital tip ulcerations, does get occasional esophageal dysplasia but denies any aspiration, denies any calcinosis denies any hidebound skin.     Patient states positive AMA, s/p eval by GI and s/p liver scan still ok per patient report     Patient also with osteoporosis by DEXA scan August 2020 for which patient does not want any treatment.     S/p PFT's and cardiology Dr Molina, patient sees q 3 years     On physical exam also noted lymph node has been ongoing for about 6 months right lower jaw, patient already evaluated by ENT  who recommended further evaluation for which patient refused.     Other issues patient also complains of stress incontinence currently followed by urology.           PMHx  Hypothyroid  Tonsillectomy  Ovarian cyst removed  Bilateral breast implants     Fam Hx  F passed from oral cancer, hx of testicular cancer  M-hypothyroid, fibromyalgia  SX1-  ulcerative colitis  Daughter-celiac disease     Soc Hx  No tob  ETOH q 2 months  No blood tx  No tattoos  NO IVDA    Addendum 7/8/2021  RF neg 7/2021  anticentromere ab 194 7/2021    Vitamin D 73 nl 12/2020   ANTHONY 1:160 6/2012 [facility]  Anti centromere >8.0 6/2012 [facility]  AMA 96.6 1/2020; AMA 90.1 8/2020  dsDNA neg 6/2012 [facility]  RNP neg 6/2012 [facility]  Smith neg 6/2012 [facility]  SCL-70 neg6/2012 [facility]  SSA neg 6/2012 [facility]  SSB neg 6/2012 [facility]  WILL-1 neg 6/2012 [facility]  C3 91 nl 6/2012 [facility]  C4 22 nl 6/2012 [facility]  CCP neg 6/2012 [facility]  RA 16.1 7/2015 [facility]  Gliadin ab neg 10/2020  TPO neg 7/2020  Thyroglobulin ab neg 7/2020  DEXA 8/12/2020 T scores -3.2, -2.4  FRAX 8/12/2020 major osteoporotic fracture risk 13.0%, hip fracture risk 3.9%     DATE OF SERVICE:  12/09/2020   PULMONARY FUNCTION TEST INTERPRETATION  The patient had pulmonary function testing done on 12/09/2020.  The pulmonary   function testing showed an FEV1/FVC ratio of 81% predicted, which is normal   and does not represent any evidence of obstruction.  The total lung capacity   is also normal at 112% predicted, not consistent with any restrictive lung   disease.  The diffusion capacity is normal at 122% predicted, which is also   normal.  In summary, the pulmonary function testing including spirometry, the   flow-volume loops, the lung volumes, and the diffusion capacity are all within   normal limits.      Current Outpatient Medications   Medication Sig Dispense Refill   • NALTREXONE HCL PO Take  by mouth. LDN     • Transdermal Base CREA 2.5 mg by Apply externally route.     • progesterone (PROMETRIUM) 100 MG CAPS Take 100 mg by mouth every day.     • Testosterone 10 MG/ACT (2%) GEL Apply  to skin as directed.     • METHYLCOBALAMIN 1 mg by Does not apply route.     • NS SOLN 50 mL with folic acid 5 MG/ML SOLN 10 mg 10 mg by Intravenous route Once. 50 mL    • Potassium 99 MG TABS Take  by mouth.     • Probiotic Product (SOLUBLE  FIBER/PROBIOTICS PO) Take  by mouth.     • MAGNESIUM ACETATE by Does not apply route.     • THYROID PO Take  by mouth. COMPOUND T3 T4 1.5 GRAIN DAILY      • Multiple Vitamin (DAILY MULTIVITAMIN) TABS Take  by mouth.     • VITAMIN D PO Take  by mouth.     • Ascorbic Acid (VITAMIN C PO) Take  by mouth.     • FISH OIL by Does not apply route.     • Methylsulfonylmethane (MSM PO) Take  by mouth.     • epinephrine (ADRENALIN) 0.1 MG/ML SOLN 2 mg by Injection route Once.     • Apoaequorin (PREVAGEN PO) Take 40 mg by mouth. (Patient not taking: Reported on 6/14/2021)       No current facility-administered medications for this visit.     She  has a past medical history of Chronic fatigue, Infectious disease, Raynauds syndrome, Snoring, Unspecified disorder of thyroid, and Urinary bladder disorder.    ROS   Other than what is mentioned in HPI or physical exam, there is no history of headaches, double vision or blurred vision. No temporal tenderness or jaw claudication. No trouble swallowing difficulties or sore throats.  No chest complaints including chest pain, cough or sputum production. No GI complaints including nausea, vomiting, change in bowel habits, or past peptic ulcer disease. No history of blood in the stools. No urinary complaints including dysuria or frequency. No history of alopecia, photosensitivity, oral ulcerations, Raynaud's phenomena.       Objective:     /60   Pulse 76   Temp 36.3 °C (97.4 °F) (Temporal)   Resp 14   Wt 51.7 kg (114 lb)   SpO2 97%  Body mass index is 20.19 kg/m².   Physical Exam:    Constitutional: Alert and oriented X3, patient is talkative with good eye contact.Skin: Warm, dry, good turgor, no rashes in visible areas, did not see any areas of hidebound skin.Eye: Equal, round and reactive, conjunctiva clear, lids normal EOM intactENMT: Lips without lesions, good dentition, no oropharyngeal ulcers, moist buccal mucosa, pinna without deformityNeck: Trachea midline, no masses, no  thyromegaly.Lymph:  No cervical lymphadenopathy, no axillary lymphadenopathy, no supraclavicular lymphadenopathy, did not see any lymphadenopathy respiratory: Unlabored respiratory effort, lungs clear to auscultation, no wheezes, no ronchi.Cardiovascular: Normal S1, S2, no murmur, no edema.Abdomen: Soft, non-tender, no masses, no hepatosplenomegaly.Psych: Alert and oriented x3, normal affect and mood.Neuro: Cranial nerves 2-12 are grossly intact, no loss of sensation LEExt:no joint laxity noted in bilateral arms, no joint laxity noted in bilateral legs slightly discolored dark fingers but there is no sclerodactyly, no digital tip ulcerations, no splinter hemorrhages,    Lab Results   Component Value Date/Time    SODIUM 137 12/04/2020 09:44 AM    POTASSIUM 4.1 12/04/2020 09:44 AM    CHLORIDE 103 12/04/2020 09:44 AM    CO2 25 12/04/2020 09:44 AM    GLUCOSE 91 12/04/2020 09:44 AM    BUN 19 12/04/2020 09:44 AM    CREATININE 0.61 12/04/2020 09:44 AM      Lab Results   Component Value Date/Time    WBC 5.8 12/04/2020 09:44 AM    RBC 4.76 12/04/2020 09:44 AM    HEMOGLOBIN 14.0 12/04/2020 09:44 AM    HEMATOCRIT 42.9 12/04/2020 09:44 AM    MCV 90.1 12/04/2020 09:44 AM    MCH 29.4 12/04/2020 09:44 AM    MCHC 32.6 (L) 12/04/2020 09:44 AM    MPV 9.2 12/04/2020 09:44 AM    NEUTSPOLYS 59.30 12/04/2020 09:44 AM    LYMPHOCYTES 27.10 12/04/2020 09:44 AM    MONOCYTES 9.50 12/04/2020 09:44 AM    EOSINOPHILS 2.70 12/04/2020 09:44 AM    BASOPHILS 1.20 12/04/2020 09:44 AM      Lab Results   Component Value Date/Time    CALCIUM 9.2 12/04/2020 09:44 AM    ASTSGOT 12 12/04/2020 09:44 AM    ALTSGPT 12 12/04/2020 09:44 AM    ALKPHOSPHAT 63 12/04/2020 09:44 AM    TBILIRUBIN 0.3 12/04/2020 09:44 AM    ALBUMIN 4.0 12/04/2020 09:44 AM    TOTPROTEIN 6.6 12/04/2020 09:44 AM     Lab Results   Component Value Date/Time    URICACID 3.5 07/23/2019 02:29 PM    ANTINUCAB Detected (A) 07/31/2020 11:16 AM     Lab Results   Component Value Date/Time     SEDRATEWES 4 07/31/2020 11:16 AM     Lab Results   Component Value Date/Time    HBA1C 5.3 07/31/2020 11:16 AM     Lab Results   Component Value Date/Time    MICROSOMALA <9.0 02/11/2021 02:10 PM    ANTITHYROGL <0.9 02/11/2021 02:10 PM     Lab Results   Component Value Date/Time    ANTIMITOCHO 90.1 (H) 07/31/2020 11:16 AM     Lab Results   Component Value Date/Time    PTHINTACT 30.7 10/07/2020 05:20 PM     Assessment and Plan:     1. CREST syndrome (HCC)  The only manifestations of mild Raynaud's and may be esophageal spasms, patient continues to prefer not to be on any management, will continue to monitor.  Today check renal functions, liver functions and CBC.  - Comp Metabolic Panel; Future  - CBC WITH DIFFERENTIAL; Future  - RHEUMATOID ARTHRITIS FACTOR; Future  - CENTROMERE AB, IGG; Future    2. Rheumatoid factor positive  No evidence of Raynaud's activity, patient did not follow through with hand and feet x-rays as ordered at last visit, today we will recheck rheumatoid factor  - Comp Metabolic Panel; Future  - CBC WITH DIFFERENTIAL; Future  - RHEUMATOID ARTHRITIS FACTOR; Future  - CENTROMERE AB, IGG; Future    3. Raynaud's disease without gangrene  Mild, patient manages with warming which resolves her Raynaud's activity immediately.  Patient does have nitroglycerin ointment to be used as needed  - Comp Metabolic Panel; Future  - CBC WITH DIFFERENTIAL; Future  - RHEUMATOID ARTHRITIS FACTOR; Future  - CENTROMERE AB, IGG; Future    4. Osteoporosis, unspecified osteoporosis type, unspecified pathological fracture presence  Last DEXA August 2020 Next DEXA August 2022  Patient opts not to have any treatment at this time is currently undergoing physical activity to try and strengthen bones  Of note vitamin D level normal at 73   continue calcium citrate 1200 mg by mouth daily and vitamin D about 2000 units by mouth daily and magnesium 200 mg by mouth daily  - Comp Metabolic Panel; Future  - CBC WITH DIFFERENTIAL;  Future  - RHEUMATOID ARTHRITIS FACTOR; Future  - CENTROMERE AB, IGG; Future    5. Autoimmune hepatitis (HCC)  Positive AMA but normal liver functions, we will recheck liver functions today also followed by PCP    6. Hypothyroidism, unspecified type  Can exacerbate joint pain, I recommend monitoring thyroid on a regular basis to assure it is not contributing to the patient's joint pain    Followup: Return in about 6 months (around 12/14/2021). or sooner fabiola Gaspar  was seen 30 minutes face-to-face of which more than 50% of the time was spent counseling the patient (excluding time for procedures)  regarding  rheumatological condition and care. Therapy was discussed in detail.      Please note that this dictation was created using voice recognition software. I have made every reasonable attempt to correct obvious errors, but I expect that there are errors of grammar and possibly content that I did not discover before finalizing the note.

## 2021-07-06 ENCOUNTER — HOSPITAL ENCOUNTER (OUTPATIENT)
Dept: LAB | Facility: MEDICAL CENTER | Age: 56
End: 2021-07-06
Attending: FAMILY MEDICINE
Payer: COMMERCIAL

## 2021-07-06 ENCOUNTER — HOSPITAL ENCOUNTER (OUTPATIENT)
Dept: LAB | Facility: MEDICAL CENTER | Age: 56
End: 2021-07-06
Attending: INTERNAL MEDICINE
Payer: COMMERCIAL

## 2021-07-06 DIAGNOSIS — R76.8 RHEUMATOID FACTOR POSITIVE: ICD-10-CM

## 2021-07-06 DIAGNOSIS — M34.1 CREST SYNDROME (HCC): ICD-10-CM

## 2021-07-06 DIAGNOSIS — M81.0 OSTEOPOROSIS, UNSPECIFIED OSTEOPOROSIS TYPE, UNSPECIFIED PATHOLOGICAL FRACTURE PRESENCE: ICD-10-CM

## 2021-07-06 DIAGNOSIS — I73.00 RAYNAUD'S DISEASE WITHOUT GANGRENE: ICD-10-CM

## 2021-07-06 LAB
BASOPHILS # BLD AUTO: 1 % (ref 0–1.8)
BASOPHILS # BLD: 0.06 K/UL (ref 0–0.12)
EOSINOPHIL # BLD AUTO: 0.11 K/UL (ref 0–0.51)
EOSINOPHIL NFR BLD: 1.9 % (ref 0–6.9)
ERYTHROCYTE [DISTWIDTH] IN BLOOD BY AUTOMATED COUNT: 45.1 FL (ref 35.9–50)
EST. AVERAGE GLUCOSE BLD GHB EST-MCNC: 94 MG/DL
HBA1C MFR BLD: 4.9 % (ref 4–5.6)
HCT VFR BLD AUTO: 40.2 % (ref 37–47)
HGB BLD-MCNC: 13.8 G/DL (ref 12–16)
IMM GRANULOCYTES # BLD AUTO: 0.01 K/UL (ref 0–0.11)
IMM GRANULOCYTES NFR BLD AUTO: 0.2 % (ref 0–0.9)
LYMPHOCYTES # BLD AUTO: 1.48 K/UL (ref 1–4.8)
LYMPHOCYTES NFR BLD: 25 % (ref 22–41)
MCH RBC QN AUTO: 30.1 PG (ref 27–33)
MCHC RBC AUTO-ENTMCNC: 34.3 G/DL (ref 33.6–35)
MCV RBC AUTO: 87.8 FL (ref 81.4–97.8)
MONOCYTES # BLD AUTO: 0.59 K/UL (ref 0–0.85)
MONOCYTES NFR BLD AUTO: 9.9 % (ref 0–13.4)
NEUTROPHILS # BLD AUTO: 3.68 K/UL (ref 2–7.15)
NEUTROPHILS NFR BLD: 62 % (ref 44–72)
NRBC # BLD AUTO: 0 K/UL
NRBC BLD-RTO: 0 /100 WBC
PLATELET # BLD AUTO: 392 K/UL (ref 164–446)
PMV BLD AUTO: 8.8 FL (ref 9–12.9)
RBC # BLD AUTO: 4.58 M/UL (ref 4.2–5.4)
WBC # BLD AUTO: 5.9 K/UL (ref 4.8–10.8)

## 2021-07-06 PROCEDURE — 84140 ASSAY OF PREGNENOLONE: CPT

## 2021-07-06 PROCEDURE — 82670 ASSAY OF TOTAL ESTRADIOL: CPT

## 2021-07-06 PROCEDURE — 84402 ASSAY OF FREE TESTOSTERONE: CPT

## 2021-07-06 PROCEDURE — 80053 COMPREHEN METABOLIC PANEL: CPT

## 2021-07-06 PROCEDURE — 86141 C-REACTIVE PROTEIN HS: CPT

## 2021-07-06 PROCEDURE — 84270 ASSAY OF SEX HORMONE GLOBUL: CPT

## 2021-07-06 PROCEDURE — 85025 COMPLETE CBC W/AUTO DIFF WBC: CPT

## 2021-07-06 PROCEDURE — 84305 ASSAY OF SOMATOMEDIN: CPT

## 2021-07-06 PROCEDURE — 84482 T3 REVERSE: CPT

## 2021-07-06 PROCEDURE — 83525 ASSAY OF INSULIN: CPT

## 2021-07-06 PROCEDURE — 36415 COLL VENOUS BLD VENIPUNCTURE: CPT

## 2021-07-06 PROCEDURE — 83516 IMMUNOASSAY NONANTIBODY: CPT

## 2021-07-06 PROCEDURE — 82306 VITAMIN D 25 HYDROXY: CPT

## 2021-07-06 PROCEDURE — 83036 HEMOGLOBIN GLYCOSYLATED A1C: CPT

## 2021-07-06 PROCEDURE — 83516 IMMUNOASSAY NONANTIBODY: CPT | Mod: 91

## 2021-07-06 PROCEDURE — 84144 ASSAY OF PROGESTERONE: CPT

## 2021-07-06 PROCEDURE — 84443 ASSAY THYROID STIM HORMONE: CPT

## 2021-07-06 PROCEDURE — 84481 FREE ASSAY (FT-3): CPT

## 2021-07-06 PROCEDURE — 80061 LIPID PANEL: CPT

## 2021-07-06 PROCEDURE — 84403 ASSAY OF TOTAL TESTOSTERONE: CPT

## 2021-07-06 PROCEDURE — 86431 RHEUMATOID FACTOR QUANT: CPT

## 2021-07-06 PROCEDURE — 82679 ASSAY OF ESTRONE: CPT

## 2021-07-07 LAB
25(OH)D3 SERPL-MCNC: 81 NG/ML (ref 30–100)
ALBUMIN SERPL BCP-MCNC: 4.7 G/DL (ref 3.2–4.9)
ALBUMIN/GLOB SERPL: 2 G/DL
ALP SERPL-CCNC: 60 U/L (ref 30–99)
ALT SERPL-CCNC: 5 U/L (ref 2–50)
ANION GAP SERPL CALC-SCNC: 12 MMOL/L (ref 7–16)
AST SERPL-CCNC: 15 U/L (ref 12–45)
BILIRUB SERPL-MCNC: 0.4 MG/DL (ref 0.1–1.5)
BUN SERPL-MCNC: 11 MG/DL (ref 8–22)
CALCIUM SERPL-MCNC: 9.1 MG/DL (ref 8.5–10.5)
CHLORIDE SERPL-SCNC: 96 MMOL/L (ref 96–112)
CHOLEST SERPL-MCNC: 228 MG/DL (ref 100–199)
CO2 SERPL-SCNC: 24 MMOL/L (ref 20–33)
CREAT SERPL-MCNC: 0.6 MG/DL (ref 0.5–1.4)
CRP SERPL HS-MCNC: 0.3 MG/L (ref 0–7.5)
FASTING STATUS PATIENT QL REPORTED: NORMAL
GLOBULIN SER CALC-MCNC: 2.3 G/DL (ref 1.9–3.5)
GLUCOSE SERPL-MCNC: 93 MG/DL (ref 65–99)
HDLC SERPL-MCNC: 120 MG/DL
LDLC SERPL CALC-MCNC: 102 MG/DL
POTASSIUM SERPL-SCNC: 4.1 MMOL/L (ref 3.6–5.5)
PROGEST SERPL-MCNC: 0.66 NG/ML
PROT SERPL-MCNC: 7 G/DL (ref 6–8.2)
RHEUMATOID FACT SER IA-ACNC: 12 IU/ML (ref 0–14)
SODIUM SERPL-SCNC: 132 MMOL/L (ref 135–145)
T3FREE SERPL-MCNC: 1.79 PG/ML (ref 2–4.4)
TRIGL SERPL-MCNC: 31 MG/DL (ref 0–149)
TSH SERPL DL<=0.005 MIU/L-ACNC: 6.74 UIU/ML (ref 0.38–5.33)

## 2021-07-08 LAB
CENTROMERE IGG TITR SER IF: 194 AU/ML (ref 0–40)
ESTRADIOL SERPL-MCNC: 45 PG/ML
IGF-I SERPL-MCNC: 93 NG/ML (ref 47–236)
IGF-I Z-SCORE SERPL: -0.5
INSULIN P FAST SERPL-ACNC: 6 UIU/ML (ref 3–19)
MITOCHONDRIA M2 IGG SER-ACNC: 73.1 UNITS (ref 0–24.9)

## 2021-07-10 LAB
ESTRONE SERPL-MCNC: 196 PG/ML
PREG SERPL-MCNC: 81 NG/DL (ref 15–132)
SHBG SERPL-SCNC: 194 NMOL/L (ref 30–135)
TESTOST FREE SERPL-MCNC: 4 PG/ML (ref 0.6–3.8)
TESTOST SERPL-MCNC: 88 NG/DL (ref 9–55)

## 2021-07-12 LAB — T3REVERSE SERPL-MCNC: 12.8 NG/DL (ref 9–27)

## 2021-07-20 ENCOUNTER — HOSPITAL ENCOUNTER (OUTPATIENT)
Dept: LAB | Facility: MEDICAL CENTER | Age: 56
End: 2021-07-20
Attending: FAMILY MEDICINE
Payer: COMMERCIAL

## 2021-07-20 PROCEDURE — 86160 COMPLEMENT ANTIGEN: CPT | Mod: 91

## 2021-07-20 PROCEDURE — 83520 IMMUNOASSAY QUANT NOS NONAB: CPT | Mod: 91

## 2021-07-20 PROCEDURE — 84588 ASSAY OF VASOPRESSIN: CPT

## 2021-07-20 PROCEDURE — 36415 COLL VENOUS BLD VENIPUNCTURE: CPT

## 2021-07-20 PROCEDURE — 84586 ASSAY OF VIP: CPT

## 2021-07-24 LAB — MISCELLANEOUS LAB RESULT MISCLAB: NORMAL

## 2021-07-28 LAB
MISCELLANEOUS LAB RESULT MISCLAB: NORMAL
MISCELLANEOUS LAB RESULT MISCLAB: NORMAL

## 2021-07-29 LAB — MISCELLANEOUS LAB RESULT MISCLAB: NORMAL

## 2021-08-07 LAB — VIP SERPL-MCNC: <13 PG/ML (ref 0–60)

## 2021-08-08 LAB — MISCELLANEOUS LAB RESULT MISCLAB: NORMAL

## 2021-08-09 LAB — MISCELLANEOUS LAB RESULT MISCLAB: NORMAL

## 2021-10-15 ENCOUNTER — OFFICE VISIT (OUTPATIENT)
Dept: URGENT CARE | Facility: CLINIC | Age: 56
End: 2021-10-15
Payer: COMMERCIAL

## 2021-10-15 VITALS
OXYGEN SATURATION: 96 % | HEART RATE: 74 BPM | TEMPERATURE: 99 F | SYSTOLIC BLOOD PRESSURE: 130 MMHG | RESPIRATION RATE: 14 BRPM | DIASTOLIC BLOOD PRESSURE: 70 MMHG

## 2021-10-15 DIAGNOSIS — K11.20 SUBMANDIBULAR GLAND INFECTION: ICD-10-CM

## 2021-10-15 DIAGNOSIS — R60.0 SUBMANDIBULAR GLAND SWELLING: ICD-10-CM

## 2021-10-15 LAB
INT CON NEG: NORMAL
INT CON POS: NORMAL
S PYO AG THROAT QL: NEGATIVE

## 2021-10-15 PROCEDURE — 99204 OFFICE O/P NEW MOD 45 MIN: CPT | Performed by: PHYSICIAN ASSISTANT

## 2021-10-15 PROCEDURE — 87880 STREP A ASSAY W/OPTIC: CPT | Performed by: PHYSICIAN ASSISTANT

## 2021-10-15 RX ORDER — METRONIDAZOLE 500 MG/1
500 TABLET ORAL 2 TIMES DAILY
Qty: 14 TABLET | Refills: 0 | Status: SHIPPED | OUTPATIENT
Start: 2021-10-15 | End: 2021-10-22

## 2021-10-15 RX ORDER — AMOXICILLIN AND CLAVULANATE POTASSIUM 875; 125 MG/1; MG/1
1 TABLET, FILM COATED ORAL 2 TIMES DAILY
Qty: 14 TABLET | Refills: 0 | Status: SHIPPED | OUTPATIENT
Start: 2021-10-15 | End: 2021-10-22

## 2021-10-15 RX ORDER — HYDROCORTISONE 20 MG/1
TABLET ORAL
COMMUNITY
Start: 2021-09-23 | End: 2022-08-30

## 2021-10-15 RX ORDER — NITROGLYCERIN 20 MG/G
OINTMENT TOPICAL
COMMUNITY

## 2021-10-15 RX ORDER — DESMOPRESSIN ACETATE 0.1 MG/1
TABLET ORAL
COMMUNITY
Start: 2021-09-11

## 2021-10-15 RX ORDER — AZITHROMYCIN 250 MG/1
TABLET, FILM COATED ORAL
COMMUNITY
Start: 2021-10-15

## 2021-10-15 RX ORDER — DESMOPRESSIN ACETATE 0.1 MG/ML
SOLUTION NASAL
COMMUNITY
Start: 2021-07-19

## 2021-10-15 ASSESSMENT — ENCOUNTER SYMPTOMS
STRIDOR: 0
COUGH: 0
HEADACHES: 0
NECK PAIN: 1
FEVER: 0
CHILLS: 1
NAUSEA: 0
SORE THROAT: 1
VOMITING: 0
PALPITATIONS: 0
DIZZINESS: 0
DIAPHORESIS: 0
MYALGIAS: 0
DIARRHEA: 0
WHEEZING: 0
SHORTNESS OF BREATH: 0
ABDOMINAL PAIN: 0
SINUS PAIN: 0
SPUTUM PRODUCTION: 0

## 2021-10-16 NOTE — PROGRESS NOTES
Subjective:   Yaneli Gaspar is a 56 y.o. female who presents for Sore Throat (x2days, swollen glands, left side of neck)      HPI:  This is a very pleasant 56-year-old female presenting to the clinic with sore throat and swollen gland on the left side of her neck x2 days.  The patient's pain has rapidly progressed over the last 24 hours.  Currently has pain and discomfort every time she swallows.  Also has tenderness to palpation over the left side of her neck.  She feels slightly chilled and fatigued.  She called her PCP thinking it was strep and had a prescription for azithromycin and Xylocaine sent to her pharmacy.  She has yet to start these medications.  Patient informs me she wants to at all cost avoid antibiotics.  She states she has been treating all her illnesses holistically and with natural remedies.  She has not had an antibiotic in over 5 years.  Does not want to have to take any today.    Review of Systems   Constitutional: Positive for chills and malaise/fatigue. Negative for diaphoresis and fever.   HENT: Positive for ear pain and sore throat. Negative for congestion and sinus pain.         Left-sided submandibular gland swelling and pain   Respiratory: Negative for cough, sputum production, shortness of breath, wheezing and stridor.    Cardiovascular: Negative for chest pain and palpitations.   Gastrointestinal: Negative for abdominal pain, diarrhea, nausea and vomiting.   Musculoskeletal: Positive for neck pain. Negative for myalgias.   Neurological: Negative for dizziness and headaches.   Endo/Heme/Allergies: Negative for environmental allergies.       Medications:    • amoxicillin-clavulanate Tabs  • azithromycin Tabs  • Daily Multivitamin Tabs  • desmopressin Soln  • desmopressin Tabs  • epinephrine Soln  • FISH OIL  • FOLIC ACID 10 MG/0.9% NACL IVPB  • hydrocortisone Tabs  • MAGNESIUM ACETATE  • METHYLCOBALAMIN  • metroNIDAZOLE Tabs  • MSM PO  • NALTREXONE HCL PO  • Nitro-Bid  Oint  • Potassium Tabs  • PREVAGEN PO  • progesterone Caps  • SOLUBLE FIBER/PROBIOTICS PO  • Testosterone Gel  • THYROID PO  • Transdermal Base Crea  • VITAMIN C PO  • VITAMIN D PO    Allergies: Sulfa drugs    Problem List: Yaneli Gaspar does not have any pertinent problems on file.    Surgical History:  Past Surgical History:   Procedure Laterality Date   • STAPEDECTOMY  11/5/2009    Performed by INNA MARMOLEJO at [facility]   • OTHER  5/2005    left ovarian cyst removed   • TONSILLECTOMY  1969       Past Social Hx: Yaneli Gaspar  reports that she has never smoked. She has never used smokeless tobacco. She reports current alcohol use. She reports that she does not use drugs.     Past Family Hx:  Yaneli Gaspar family history is not on file.     Problem list, medications, and allergies reviewed by myself today in Epic.     Objective:     /70 (BP Location: Left arm, Patient Position: Sitting, BP Cuff Size: Adult)   Pulse 74   Temp 37.2 °C (99 °F) (Temporal)   Resp 14   SpO2 96%     Physical Exam  Constitutional:       General: She is not in acute distress.     Appearance: Normal appearance. She is not ill-appearing, toxic-appearing or diaphoretic.   HENT:      Head: Normocephalic and atraumatic.      Salivary Glands: Left salivary gland is diffusely enlarged and tender.        Comments: Approximately 2 x 2 centimeter tender left-sided submandibular gland.     Right Ear: Tympanic membrane, ear canal and external ear normal.      Left Ear: Tympanic membrane, ear canal and external ear normal.      Nose: Nose normal. No congestion or rhinorrhea.      Mouth/Throat:      Mouth: Mucous membranes are moist. No angioedema.      Pharynx: Uvula midline. No pharyngeal swelling, oropharyngeal exudate, posterior oropharyngeal erythema or uvula swelling.      Tonsils: No tonsillar abscesses.      Comments: There is no purulent discharge from the [place]'s or [place] duct  Eyes:       Conjunctiva/sclera: Conjunctivae normal.   Cardiovascular:      Rate and Rhythm: Normal rate and regular rhythm.      Pulses: Normal pulses.      Heart sounds: Normal heart sounds.   Pulmonary:      Effort: Pulmonary effort is normal.      Breath sounds: Normal breath sounds. No wheezing.   Musculoskeletal:      Cervical back: Normal range of motion. Tenderness present. No muscular tenderness.   Lymphadenopathy:      Cervical: No cervical adenopathy.   Skin:     General: Skin is warm and dry.      Capillary Refill: Capillary refill takes less than 2 seconds.   Neurological:      Mental Status: She is alert.   Psychiatric:         Mood and Affect: Mood normal.         Thought Content: Thought content normal.       Rapid strep: Negative    Assessment/Plan:     Comments/MDM:     • Differential diagnoses and treatment options were discussed with the patient at length today.  Current differentials include obstructive submandibular gland stone versus sialoadenitis versus abscess.  Patient symptoms have been present for the last 2 days and rapidly worsening.  She has extreme tenderness to palpation over the left submandibular gland.  Currently all vital signs stable and within normal limits.  She does feel slightly feverish and chilled.  I informed the patient that these types of infections can rapidly worsen and have the potential to turn septic.  She at all cost wants to avoid antibiotics or evaluation in the emergency department with imaging.  At this time I discussed supportive measures such as increasing her fluid intake, sour candies warm and gentle massage and compresses over the gland.  I sent a prescription of Augmentin and metronidazole to her pharmacy shall she wish to pick it up and begin treatment.  She was given strict ER precautions however for any worsening pain, difficulty swallowing, fevers, weakness, nausea or vomiting.  If any of these present she needs to follow-up in the emergency department  immediately as this will likely require IV antibiotics and close monitoring.  Patient verbalized an understanding of this.     Diagnosis and associated orders:     1. Submandibular gland swelling  POCT Rapid Strep A   2. Submandibular gland infection  amoxicillin-clavulanate (AUGMENTIN) 875-125 MG Tab    metroNIDAZOLE (FLAGYL) 500 MG Tab    POCT Rapid Strep A            Differential diagnosis, natural history, supportive care, and indications for immediate follow-up discussed.    Advised the patient to follow-up with the primary care physician for recheck, reevaluation, and consideration of further management.    Please note that this dictation was created using voice recognition software. I have made reasonable attempt to correct obvious errors, but I expect that there are errors of grammar and possibly content that I did not discover before finalizing the note.    This note was electronically signed by DENZEL Rodriges PA-C

## 2021-10-16 NOTE — PATIENT INSTRUCTIONS
Salivary Gland Infection    A salivary gland infection is an infection in one or more of the glands that produce saliva. You have six major salivary glands. Each gland has a duct that carries saliva into your mouth. Saliva keeps your mouth moist and breaks down the food that you eat. It also helps prevent tooth decay.  Two salivary glands are located just in front of your ears (parotid). The ducts for these glands open up inside your cheeks, near your back teeth. You also have two glands under your tongue (sublingual) and two glands under your jaw (submandibular). The ducts for these glands open under your tongue. Any salivary gland can become infected. Most infections occur in the parotid glands or submandibular glands.  What are the causes?  This condition may be caused by bacteria or viruses.  · The bacteria that cause salivary gland infections are usually the same bacteria that normally live in your mouth.  ? A stone can form in a salivary gland and block the flow of saliva. As a result, saliva backs up into the salivary gland. Bacteria may then start to grow behind the blockage and cause infection.  ? Bacterial infections usually cause pain and swelling on one side of the face. Submandibular gland swelling occurs under the jaw. Parotid swelling occurs in front of the ear.  ? Bacterial infections are more common in adults.  · The mumps virus is the most common cause of viral salivary gland infections. However, mumps is now rare because of vaccination.  ? This infection causes swelling in both parotid glands.  ? Viral infections are more common in children.  What increases the risk?  The following factors may make you more likely to develop a bacterial infection:  · Not taking good care of your mouth and teeth (poor oral hygiene).  · Smoking.  · Not drinking enough water.  · Having a disease that causes dry mouth and dry eyes (Mikulicz syndrome or Sjogren syndrome).  A viral infection is more likely to occur in  children who do not get the MMR (measles, mumps, rubella) vaccine.  What are the signs or symptoms?  The main sign of a salivary gland infection is a swollen salivary gland. This type of inflammation is often called sialadenitis. You may have swelling in front of your ear, under your jaw, or under your tongue. Swelling may get worse when you eat and decrease after you eat. Other signs and symptoms include:  · Pain.  · Tenderness.  · Redness.  · Dry mouth.  · Bad taste in your mouth.  · Difficulty chewing and swallowing.  · Fever.  How is this diagnosed?  This condition may be diagnosed based on:  · Your signs and symptoms.  · A physical exam. During the exam, your health care provider will look and feel inside your mouth to see whether a stone is blocking a salivary gland duct.  · Tests, such as:  ? An X-ray to check for a stone.  ? An ultrasound, CT scan, or MRI to look for an abscess and to rule out other causes of swelling.  ? A culture and sensitivity test. In this test, a sample of pus is taken from the salivary gland with a swab or by using a needle (aspiration). The sample is tested in a lab to determine the type of bacteria that is growing and which antibiotic medicines will work against it.  You may need to see an ear, nose, and throat specialist (ENT or otolaryngologist) for diagnosis and treatment.  How is this treated?  Viral salivary gland infections usually clear up without treatment. Bacterial infections are usually treated with antibiotic medicine. Severe infections that cause difficulty with swallowing may be treated with an IV antibiotic in the hospital.  Other treatments may include:  · Probing and widening the salivary duct to allow a stone to pass. In some cases, a thin, flexible scope (endoscope) may be inserted into the duct to find a stone and remove it.  · Breaking up a stone using sound waves.  · Draining an infected gland (abscess) with a needle.  · Surgery to:  ? Remove a stone.  ? Drain  pus from an abscess.  ? Remove a badly infected gland.  Follow these instructions at home:    Medicines  · Take over-the-counter and prescription medicines only as told by your health care provider.  · If you were prescribed an antibiotic medicine, take it as told by your health care provider. Do not stop taking the antibiotic even if you start to feel better.  To relieve discomfort  · Follow these instructions every few hours:  ? Suck on a lemon candy to stimulate the flow of saliva.  ? Put a warm compress over the gland.  ? Gently massage the gland.  · Rinse your mouth with a salt-water mixture 3-4 times a day or as needed. To make a salt-water mixture, completely dissolve ½-1 tsp of salt in 1 cup of warm water.  General instructions  · Practice good oral hygiene by brushing and flossing your teeth after meals and before you go to bed.  · Drink enough fluid to keep your urine pale yellow.  · Do not use any products that contain nicotine or tobacco, such as cigarettes and e-cigarettes. If you need help quitting, ask your health care provider.  · Keep all follow-up visits as told by your health care provider. This is important.  Contact a health care provider if:  · You have pain and swelling in your face, jaw, or mouth after eating.  · You have persistent swelling in any of these places:  ? In front of your ear.  ? Under your jaw.  ? Inside your mouth.  Get help right away if:  · You have pain and swelling in your face, jaw, or mouth, and this is getting worse.  · Your pain and swelling make it hard to swallow or breathe.  Summary  · A salivary gland infection is an infection in one or more of the glands that produce saliva. You have six major salivary glands. Each gland has a duct that carries saliva into your mouth.  · Any salivary gland can become infected. Most infections occur in the glands just in front of your ears (parotid glands) or the glands under your jaw (submandibular glands).  · This condition may be  caused by bacteria or viruses.  · Salivary gland infections caused by a virus usually clear up without treatment. Bacterial infections are usually treated with antibiotic medicine.  This information is not intended to replace advice given to you by your health care provider. Make sure you discuss any questions you have with your health care provider.  Document Released: 01/25/2006 Document Revised: 01/16/2019 Document Reviewed: 01/16/2019  [employer] Patient Education © 2020 [employer]

## 2021-12-06 ENCOUNTER — APPOINTMENT (OUTPATIENT)
Dept: RHEUMATOLOGY | Facility: MEDICAL CENTER | Age: 56
End: 2021-12-06
Payer: COMMERCIAL

## 2022-01-10 ENCOUNTER — OFFICE VISIT (OUTPATIENT)
Dept: RHEUMATOLOGY | Facility: MEDICAL CENTER | Age: 57
End: 2022-01-10
Payer: COMMERCIAL

## 2022-01-10 VITALS
TEMPERATURE: 97.2 F | OXYGEN SATURATION: 92 % | SYSTOLIC BLOOD PRESSURE: 110 MMHG | RESPIRATION RATE: 14 BRPM | HEART RATE: 76 BPM | WEIGHT: 112 LBS | BODY MASS INDEX: 19.84 KG/M2 | DIASTOLIC BLOOD PRESSURE: 60 MMHG

## 2022-01-10 DIAGNOSIS — M35.00 SICCA, UNSPECIFIED TYPE (HCC): ICD-10-CM

## 2022-01-10 DIAGNOSIS — M81.0 OSTEOPOROSIS, UNSPECIFIED OSTEOPOROSIS TYPE, UNSPECIFIED PATHOLOGICAL FRACTURE PRESENCE: ICD-10-CM

## 2022-01-10 DIAGNOSIS — E03.9 HYPOTHYROIDISM, UNSPECIFIED TYPE: ICD-10-CM

## 2022-01-10 DIAGNOSIS — M34.1 CREST SYNDROME (HCC): ICD-10-CM

## 2022-01-10 DIAGNOSIS — K75.4 AUTOIMMUNE HEPATITIS (HCC): ICD-10-CM

## 2022-01-10 DIAGNOSIS — I73.00 RAYNAUD'S DISEASE WITHOUT GANGRENE: ICD-10-CM

## 2022-01-10 DIAGNOSIS — R76.8 RHEUMATOID FACTOR POSITIVE: ICD-10-CM

## 2022-01-10 PROCEDURE — 99214 OFFICE O/P EST MOD 30 MIN: CPT | Performed by: INTERNAL MEDICINE

## 2022-01-10 ASSESSMENT — FIBROSIS 4 INDEX: FIB4 SCORE: 0.98

## 2022-01-10 NOTE — PROGRESS NOTES
Chief Complaint- joint pain     Subjective:   Yaneli Gaspar is a 56 y.o. 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  Patient states she was diagnosed by 2010 and has had symptoms for about 5 years prior to her diagnosis.  Symptoms involve esophageal dysplasia/esophageal spasms, also Raynaud's.  Patient currently not on any treatment, states her esophageal spasms have been quite good, patient still does get Raynaud's but patient resolves the Raynaud's with conservative measures.  Patient does have nitroglycerin ointment that she uses at home for when she goes skiing.  Patient continues to not be very interested in any treatment options at this time.    However patient is complaining of dry mouth, states her teeth are not doing well is scheduled to see her dentist this week.  States that she has dry mouth does not produce a lot of saliva.       Patient states that she is followed by Dr. Molina in cardiology, follows Dr. Ornelas every 3 years last echocardiogram about December 2019  normal per patient report.  Patient denies any shortness of breath, does get some mild Raynaud's but no digital tip ulcerations, does get occasional esophageal dysplasia but denies any aspiration, denies any calcinosis denies any hidebound skin.     Patient states positive AMA, s/p eval by GI and s/p liver scan still ok per patient report     Patient also with osteoporosis by DEXA scan August 2020 for which patient does not want any treatment.     S/p PFT's and cardiology Dr Molina, patient sees q 3 years     On physical exam also noted lymph node has been ongoing for about 6 months right lower jaw, patient already evaluated by ENT  who recommended further evaluation for which patient refused.     Other issues patient also complains of  stress incontinence currently followed by urology.           PMHx  Hypothyroid  Tonsillectomy  Ovarian cyst removed  Bilateral breast implants     Fam Hx  F passed from oral cancer, hx of testicular cancer  M-hypothyroid, fibromyalgia  SX1- ulcerative colitis  Daughter-celiac disease     Soc Hx  No tob  ETOH q 2 months  No blood tx  No tattoos  NO IVDA     Vitamin D 73 nl 12/2020   ANTHONY 1:160 6/2012 [facility]  Anti centromere >8.0 6/2012 [facility]; anticentromere ab 194 7/2021  AMA 96.6 1/2020; AMA 90.1 8/2020  dsDNA neg 6/2012 [facility]  RNP neg 6/2012 [facility]  Smith neg 6/2012 [facility]  SCL-70 neg6/2012 [facility]  SSA neg 6/2012 [facility]  SSB neg 6/2012 [facility]  WILL-1 neg 6/2012 [facility]  C3 91 nl 6/2012 [facility]  C4 22 nl 6/2012 [facility]  CCP neg 6/2012 [facility]  RA 16.1 7/2015 [facility]; RF neg 7/2021   Gliadin ab neg 10/2020  TPO neg 7/2020  Thyroglobulin ab neg 7/2020  DEXA 8/12/2020 T scores -3.2, -2.4  FRAX 8/12/2020 major osteoporotic fracture risk 13.0%, hip fracture risk 3.9%     DATE OF SERVICE:  12/09/2020   PULMONARY FUNCTION TEST INTERPRETATION  The patient had pulmonary function testing done on 12/09/2020.  The pulmonary   function testing showed an FEV1/FVC ratio of 81% predicted, which is normal   and does not represent any evidence of obstruction.  The total lung capacity   is also normal at 112% predicted, not consistent with any restrictive lung   disease.  The diffusion capacity is normal at 122% predicted, which is also   normal.  In summary, the pulmonary function testing including spirometry, the   flow-volume loops, the lung volumes, and the diffusion capacity are all within   normal limits.      Current Outpatient Medications   Medication Sig Dispense Refill   • nitroglycerin (NITRO-BID) 2 % Ointment Nitro-Bid 2 % transdermal ointment     • NALTREXONE HCL PO Take  by mouth. LDN     • progesterone (PROMETRIUM) 100 MG CAPS Take 100 mg by mouth every day.     • Testosterone 10 MG/ACT (2%) GEL Apply  to skin as  directed.     • METHYLCOBALAMIN 1 mg by Does not apply route.     • Potassium 99 MG TABS Take  by mouth.     • Probiotic Product (SOLUBLE FIBER/PROBIOTICS PO) Take  by mouth.     • MAGNESIUM ACETATE by Does not apply route.     • Multiple Vitamin (DAILY MULTIVITAMIN) TABS Take  by mouth.     • VITAMIN D PO Take  by mouth.     • Ascorbic Acid (VITAMIN C PO) Take  by mouth.     • FISH OIL by Does not apply route.     • Methylsulfonylmethane (MSM PO) Take  by mouth.     • azithromycin (ZITHROMAX) 250 MG Tab  (Patient not taking: Reported on 1/10/2022)     • desmopressin (DDAVP) 0.1 MG Tab  (Patient not taking: Reported on 1/10/2022)     • desmopressin (DDAVP) 0.01 % Solution  (Patient not taking: Reported on 1/10/2022)     • hydrocortisone (CORTEF) 20 MG Tab  (Patient not taking: Reported on 1/10/2022)     • Transdermal Base CREA 2.5 mg by Apply externally route. (Patient not taking: Reported on 1/10/2022)     • NS SOLN 50 mL with folic acid 5 MG/ML SOLN 10 mg 10 mg by Intravenous route Once. 50 mL    • epinephrine (ADRENALIN) 0.1 MG/ML SOLN 2 mg by Injection route Once.     • Apoaequorin (PREVAGEN PO) Take 40 mg by mouth. (Patient not taking: Reported on 6/14/2021)     • THYROID PO Take  by mouth. COMPOUND T3 T4 1.5 GRAIN DAILY  (Patient not taking: Reported on 1/10/2022)       No current facility-administered medications for this visit.     She  has a past medical history of Chronic fatigue, Infectious disease, Raynauds syndrome, Snoring, Unspecified disorder of thyroid, and Urinary bladder disorder.    ROS   Other than what is mentioned in HPI or physical exam, there is no history of headaches, double vision or blurred vision. No temporal tenderness or jaw claudication. No trouble swallowing difficulties or sore throats.  No chest complaints including chest pain, cough or sputum production. No GI complaints including nausea, vomiting, change in bowel habits, or past peptic ulcer disease. No history of blood in the  stools. No urinary complaints including dysuria or frequency. No history of alopecia, photosensitivity, oral ulcerations, Raynaud's phenomena.       Objective:     /60   Pulse 76   Temp 36.2 °C (97.2 °F) (Temporal)   Resp 14   Wt 50.8 kg (112 lb)   SpO2 92%  Body mass index is 19.84 kg/m².   Physical Exam:    Constitutional: Alert and oriented X3, patient is talkative with good eye contact.Skin: Warm, dry, good turgor, no rashes in visible areas, no digital tip ulcerations neither in fingers or toes.Eye: Equal, round and reactive, conjunctiva clear, lids normal EOM intactENMT: Lips without lesions, good dentition, no oropharyngeal ulcers, moist buccal mucosa, pinna without deformityNeck: Trachea midline, no masses, no thyromegaly.Lymph:  No cervical lymphadenopathy, no axillary lymphadenopathy, no supraclavicular lymphadenopathyRespiratory: Unlabored respiratory effort, lungs clear to auscultation, no wheezes, no ronchi, good breath sounds all quadrants.Cardiovascular: Normal S1, S2, no murmur, no edema.Abdomen: Soft, non-tender, no masses, no hepatosplenomegaly.Psych: Alert and oriented x3, normal affect and mood.Neuro: Cranial nerves 2-12 are grossly intact, no loss of sensation LEExt:no joint laxity noted in bilateral arms, no joint laxity noted in bilateral legs, no lower extremity edema, no active Raynaud's    Lab Results   Component Value Date/Time    SODIUM 132 (L) 07/06/2021 01:56 PM    POTASSIUM 4.1 07/06/2021 01:56 PM    CHLORIDE 96 07/06/2021 01:56 PM    CO2 24 07/06/2021 01:56 PM    GLUCOSE 93 07/06/2021 01:56 PM    BUN 11 07/06/2021 01:56 PM    CREATININE 0.60 07/06/2021 01:56 PM      Lab Results   Component Value Date/Time    WBC 5.9 07/06/2021 01:56 PM    RBC 4.58 07/06/2021 01:56 PM    HEMOGLOBIN 13.8 07/06/2021 01:56 PM    HEMATOCRIT 40.2 07/06/2021 01:56 PM    MCV 87.8 07/06/2021 01:56 PM    MCH 30.1 07/06/2021 01:56 PM    MCHC 34.3 07/06/2021 01:56 PM    MPV 8.8 (L) 07/06/2021 01:56 PM     NEUTSPOLYS 62.00 07/06/2021 01:56 PM    LYMPHOCYTES 25.00 07/06/2021 01:56 PM    MONOCYTES 9.90 07/06/2021 01:56 PM    EOSINOPHILS 1.90 07/06/2021 01:56 PM    BASOPHILS 1.00 07/06/2021 01:56 PM      Lab Results   Component Value Date/Time    CALCIUM 9.1 07/06/2021 01:56 PM    ASTSGOT 15 07/06/2021 01:56 PM    ALTSGPT 5 07/06/2021 01:56 PM    ALKPHOSPHAT 60 07/06/2021 01:56 PM    TBILIRUBIN 0.4 07/06/2021 01:56 PM    ALBUMIN 4.7 07/06/2021 01:56 PM    TOTPROTEIN 7.0 07/06/2021 01:56 PM     Lab Results   Component Value Date/Time    URICACID 3.5 07/23/2019 02:29 PM    RHEUMFACTN 12 07/06/2021 01:54 PM    ANTINUCAB Detected (A) 07/31/2020 11:16 AM     Lab Results   Component Value Date/Time    CENTROMBAB 194 (H) 07/06/2021 01:54 PM     Lab Results   Component Value Date/Time    SEDRATEWES 4 07/31/2020 11:16 AM     Lab Results   Component Value Date/Time    HBA1C 4.9 07/06/2021 01:56 PM     Lab Results   Component Value Date/Time    MICROSOMALA <9.0 02/11/2021 02:10 PM    ANTITHYROGL <0.9 02/11/2021 02:10 PM     Lab Results   Component Value Date/Time    ANTIMITOCHO 73.1 (H) 07/06/2021 01:56 PM     Lab Results   Component Value Date/Time    PTHINTACT 30.7 10/07/2020 05:20 PM         Assessment and Plan:     1. CREST syndrome (HCC)  With manifestations of Raynaud's and esophageal spasms well-controlled with conservative measures, continue to monitor.    2. Raynaud's disease without gangrene  Patient uses nitroglycerin ointment as needed    3. Osteoporosis, unspecified osteoporosis type, unspecified pathological fracture presence  Last DEXA August 2020 Next DEXA August 2022, DEXA ordered for patient  Last DEXA did show some osteoporosis, patient opted not to have any treatment at this time, patient trying to strengthen bones undergoing physical activity.  Of note vitamin D level in normal range   continue calcium citrate 1200 mg by mouth daily and vitamin D about 2000 units by mouth daily and magnesium 200 mg by mouth  daily  - DS-BONE DENSITY STUDY (DEXA); Future    4. Sicca, unspecified type (HCC)  Patient complains of dry mouth, states her teeth are not doing well has a dentist appointment this week, offered evoxac 3 mg p.o. 3 times daily, patient will consider    5. Autoimmune hepatitis (HCC)  Positive AMA with normal liver functions, continue to monitor    6. Hypothyroidism, unspecified type  Followed by patients PCP, can exacerbate joint pain, I recommend monitoring thyroid on a regular basis to assure it is not contributing to the patient's joint pain    Followup: Return in about 6 months (around 7/10/2022). or sooner fabiola Gaspar  was seen 30 minutes face-to-face of which more than 50% of the time was spent counseling the patient (excluding time for procedures)  regarding  rheumatological condition and care. Therapy was discussed in detail.      Please note that this dictation was created using voice recognition software. I have made every reasonable attempt to correct obvious errors, but I expect that there are errors of grammar and possibly content that I did not discover before finalizing the note.

## 2022-03-11 ENCOUNTER — HOSPITAL ENCOUNTER (OUTPATIENT)
Dept: LAB | Facility: MEDICAL CENTER | Age: 57
End: 2022-03-11
Attending: FAMILY MEDICINE
Payer: COMMERCIAL

## 2022-03-11 LAB
25(OH)D3 SERPL-MCNC: 92 NG/ML (ref 30–100)
BASOPHILS # BLD AUTO: 1.9 % (ref 0–1.8)
BASOPHILS # BLD: 0.11 K/UL (ref 0–0.12)
EOSINOPHIL # BLD AUTO: 0.31 K/UL (ref 0–0.51)
EOSINOPHIL NFR BLD: 5.4 % (ref 0–6.9)
ERYTHROCYTE [DISTWIDTH] IN BLOOD BY AUTOMATED COUNT: 49.9 FL (ref 35.9–50)
EST. AVERAGE GLUCOSE BLD GHB EST-MCNC: 91 MG/DL
HBA1C MFR BLD: 4.8 % (ref 4–5.6)
HCT VFR BLD AUTO: 42.8 % (ref 37–47)
HGB BLD-MCNC: 14 G/DL (ref 12–16)
IMM GRANULOCYTES # BLD AUTO: 0.01 K/UL (ref 0–0.11)
IMM GRANULOCYTES NFR BLD AUTO: 0.2 % (ref 0–0.9)
LYMPHOCYTES # BLD AUTO: 1.65 K/UL (ref 1–4.8)
LYMPHOCYTES NFR BLD: 29 % (ref 22–41)
MCH RBC QN AUTO: 30.2 PG (ref 27–33)
MCHC RBC AUTO-ENTMCNC: 32.7 G/DL (ref 33.6–35)
MCV RBC AUTO: 92.4 FL (ref 81.4–97.8)
MONOCYTES # BLD AUTO: 0.46 K/UL (ref 0–0.85)
MONOCYTES NFR BLD AUTO: 8.1 % (ref 0–13.4)
NEUTROPHILS # BLD AUTO: 3.15 K/UL (ref 2–7.15)
NEUTROPHILS NFR BLD: 55.4 % (ref 44–72)
NRBC # BLD AUTO: 0 K/UL
NRBC BLD-RTO: 0 /100 WBC
PLATELET # BLD AUTO: 353 K/UL (ref 164–446)
PMV BLD AUTO: 10.1 FL (ref 9–12.9)
RBC # BLD AUTO: 4.63 M/UL (ref 4.2–5.4)
T3FREE SERPL-MCNC: 1.99 PG/ML (ref 2–4.4)
THYROPEROXIDASE AB SERPL-ACNC: <9 IU/ML (ref 0–9)
TSH SERPL DL<=0.005 MIU/L-ACNC: 14.4 UIU/ML (ref 0.38–5.33)
WBC # BLD AUTO: 5.7 K/UL (ref 4.8–10.8)

## 2022-03-11 PROCEDURE — 84144 ASSAY OF PROGESTERONE: CPT

## 2022-03-11 PROCEDURE — 84305 ASSAY OF SOMATOMEDIN: CPT

## 2022-03-11 PROCEDURE — 80053 COMPREHEN METABOLIC PANEL: CPT

## 2022-03-11 PROCEDURE — 82670 ASSAY OF TOTAL ESTRADIOL: CPT

## 2022-03-11 PROCEDURE — 80061 LIPID PANEL: CPT

## 2022-03-11 PROCEDURE — 83036 HEMOGLOBIN GLYCOSYLATED A1C: CPT

## 2022-03-11 PROCEDURE — 83520 IMMUNOASSAY QUANT NOS NONAB: CPT | Mod: 91

## 2022-03-11 PROCEDURE — 84403 ASSAY OF TOTAL TESTOSTERONE: CPT

## 2022-03-11 PROCEDURE — 86141 C-REACTIVE PROTEIN HS: CPT

## 2022-03-11 PROCEDURE — 86800 THYROGLOBULIN ANTIBODY: CPT

## 2022-03-11 PROCEDURE — 84140 ASSAY OF PREGNENOLONE: CPT

## 2022-03-11 PROCEDURE — 82679 ASSAY OF ESTRONE: CPT

## 2022-03-11 PROCEDURE — 84146 ASSAY OF PROLACTIN: CPT

## 2022-03-11 PROCEDURE — 83090 ASSAY OF HOMOCYSTEINE: CPT

## 2022-03-11 PROCEDURE — 84270 ASSAY OF SEX HORMONE GLOBUL: CPT

## 2022-03-11 PROCEDURE — 83519 RIA NONANTIBODY: CPT

## 2022-03-11 PROCEDURE — 84482 T3 REVERSE: CPT

## 2022-03-11 PROCEDURE — 84481 FREE ASSAY (FT-3): CPT

## 2022-03-11 PROCEDURE — 82570 ASSAY OF URINE CREATININE: CPT

## 2022-03-11 PROCEDURE — 84443 ASSAY THYROID STIM HORMONE: CPT

## 2022-03-11 PROCEDURE — 84588 ASSAY OF VASOPRESSIN: CPT

## 2022-03-11 PROCEDURE — 36415 COLL VENOUS BLD VENIPUNCTURE: CPT

## 2022-03-11 PROCEDURE — 84586 ASSAY OF VIP: CPT

## 2022-03-11 PROCEDURE — 85025 COMPLETE CBC W/AUTO DIFF WBC: CPT

## 2022-03-11 PROCEDURE — 86376 MICROSOMAL ANTIBODY EACH: CPT

## 2022-03-11 PROCEDURE — 84402 ASSAY OF FREE TESTOSTERONE: CPT

## 2022-03-11 PROCEDURE — 82306 VITAMIN D 25 HYDROXY: CPT

## 2022-03-11 PROCEDURE — 83525 ASSAY OF INSULIN: CPT

## 2022-03-11 PROCEDURE — 83945 ASSAY OF OXALATE: CPT

## 2022-03-12 LAB
ALBUMIN SERPL BCP-MCNC: 5 G/DL (ref 3.2–4.9)
ALBUMIN/GLOB SERPL: 2.2 G/DL
ALP SERPL-CCNC: 56 U/L (ref 30–99)
ALT SERPL-CCNC: 10 U/L (ref 2–50)
ANION GAP SERPL CALC-SCNC: 12 MMOL/L (ref 7–16)
AST SERPL-CCNC: 23 U/L (ref 12–45)
BILIRUB SERPL-MCNC: 0.5 MG/DL (ref 0.1–1.5)
BUN SERPL-MCNC: 15 MG/DL (ref 8–22)
CALCIUM SERPL-MCNC: 9.4 MG/DL (ref 8.5–10.5)
CHLORIDE SERPL-SCNC: 101 MMOL/L (ref 96–112)
CHOLEST SERPL-MCNC: 244 MG/DL (ref 100–199)
CO2 SERPL-SCNC: 25 MMOL/L (ref 20–33)
CREAT SERPL-MCNC: 0.81 MG/DL (ref 0.5–1.4)
CRP SERPL HS-MCNC: 0.4 MG/L (ref 0–3)
ESTRADIOL SERPL-MCNC: 12.3 PG/ML
GLOBULIN SER CALC-MCNC: 2.3 G/DL (ref 1.9–3.5)
GLUCOSE SERPL-MCNC: 87 MG/DL (ref 65–99)
HCYS SERPL-SCNC: 7.85 UMOL/L
HDLC SERPL-MCNC: 125 MG/DL
LDLC SERPL CALC-MCNC: 108 MG/DL
POTASSIUM SERPL-SCNC: 4.7 MMOL/L (ref 3.6–5.5)
PROGEST SERPL-MCNC: 0.39 NG/ML
PROLACTIN SERPL-MCNC: 11.6 NG/ML (ref 2.8–26)
PROT SERPL-MCNC: 7.3 G/DL (ref 6–8.2)
SODIUM SERPL-SCNC: 138 MMOL/L (ref 135–145)
TRIGL SERPL-MCNC: 56 MG/DL (ref 0–149)

## 2022-03-16 LAB — THYROGLOB AB SERPL-ACNC: <0.9 IU/ML (ref 0–4)

## 2022-03-17 LAB
IGF-I SERPL-MCNC: 92 NG/ML (ref 46–238)
IGF-I Z-SCORE SERPL: -0.5
INSULIN P FAST SERPL-ACNC: 4 UIU/ML (ref 3–25)
PREG SERPL-MCNC: 74 NG/DL (ref 15–132)

## 2022-03-18 LAB — MISCELLANEOUS LAB RESULT MISCLAB: NORMAL

## 2022-03-19 LAB
COLLECT DURATION TIME SPEC: NORMAL H
CREAT 24H UR-MCNC: 15 MG/DL
OXALATE 24H UR-MCNC: 3 MG/L
OXALATE 24H UR-MRATE: NORMAL MG/D (ref 13–40)
TOTAL VOLUME 1105: NORMAL ML

## 2022-03-20 LAB
MISCELLANEOUS LAB RESULT MISCLAB: NORMAL
T3REVERSE SERPL-MCNC: 14 NG/DL (ref 9–27)

## 2022-03-21 LAB — VIP SERPL-MCNC: <13 PG/ML (ref 0–60)

## 2022-03-22 LAB — ESTRONE SERPL-MCNC: 37.1 PG/ML

## 2022-03-23 LAB
SHBG SERPL-SCNC: 152 NMOL/L (ref 17–125)
TESTOST FREE SERPL-MCNC: 5.4 PG/ML (ref 0.6–3.8)
TESTOST SERPL-MCNC: 95 NG/DL (ref 9–55)

## 2022-03-28 LAB — MISCELLANEOUS LAB RESULT MISCLAB: NORMAL

## 2022-04-08 LAB — MISCELLANEOUS LAB RESULT MISCLAB: NORMAL

## 2022-05-05 ENCOUNTER — HOSPITAL ENCOUNTER (OUTPATIENT)
Dept: LAB | Facility: MEDICAL CENTER | Age: 57
End: 2022-05-05
Attending: FAMILY MEDICINE
Payer: COMMERCIAL

## 2022-05-05 LAB
ESTRADIOL SERPL-MCNC: 71.6 PG/ML
PROGEST SERPL-MCNC: 0.39 NG/ML
T3FREE SERPL-MCNC: 2.47 PG/ML (ref 2–4.4)
THYROPEROXIDASE AB SERPL-ACNC: <9 IU/ML (ref 0–9)
TSH SERPL DL<=0.005 MIU/L-ACNC: 3.13 UIU/ML (ref 0.38–5.33)

## 2022-05-05 PROCEDURE — 84402 ASSAY OF FREE TESTOSTERONE: CPT

## 2022-05-05 PROCEDURE — 86800 THYROGLOBULIN ANTIBODY: CPT

## 2022-05-05 PROCEDURE — 84144 ASSAY OF PROGESTERONE: CPT

## 2022-05-05 PROCEDURE — 82670 ASSAY OF TOTAL ESTRADIOL: CPT

## 2022-05-05 PROCEDURE — 84403 ASSAY OF TOTAL TESTOSTERONE: CPT

## 2022-05-05 PROCEDURE — 82679 ASSAY OF ESTRONE: CPT

## 2022-05-05 PROCEDURE — 84270 ASSAY OF SEX HORMONE GLOBUL: CPT

## 2022-05-05 PROCEDURE — 36415 COLL VENOUS BLD VENIPUNCTURE: CPT

## 2022-05-05 PROCEDURE — 84482 T3 REVERSE: CPT

## 2022-05-05 PROCEDURE — 84481 FREE ASSAY (FT-3): CPT

## 2022-05-05 PROCEDURE — 84443 ASSAY THYROID STIM HORMONE: CPT

## 2022-05-05 PROCEDURE — 86376 MICROSOMAL ANTIBODY EACH: CPT

## 2022-05-07 LAB — THYROGLOB AB SERPL-ACNC: <0.9 IU/ML (ref 0–4)

## 2022-05-11 LAB — T3REVERSE SERPL-MCNC: 32.6 NG/DL (ref 9–27)

## 2022-05-18 LAB — ESTRONE SERPL-MCNC: 157.7 PG/ML

## 2022-05-19 LAB
SHBG SERPL-SCNC: 216 NMOL/L (ref 17–125)
TESTOST FREE SERPL-MCNC: 0.9 PG/ML (ref 0.6–3.8)
TESTOST SERPL-MCNC: 21 NG/DL (ref 9–55)

## 2022-08-30 ENCOUNTER — OFFICE VISIT (OUTPATIENT)
Dept: RHEUMATOLOGY | Facility: MEDICAL CENTER | Age: 57
End: 2022-08-30
Attending: INTERNAL MEDICINE
Payer: COMMERCIAL

## 2022-08-30 VITALS
TEMPERATURE: 98.2 F | OXYGEN SATURATION: 98 % | HEART RATE: 84 BPM | SYSTOLIC BLOOD PRESSURE: 122 MMHG | BODY MASS INDEX: 19.84 KG/M2 | DIASTOLIC BLOOD PRESSURE: 60 MMHG | WEIGHT: 112 LBS | RESPIRATION RATE: 12 BRPM

## 2022-08-30 DIAGNOSIS — M81.0 OSTEOPOROSIS, UNSPECIFIED OSTEOPOROSIS TYPE, UNSPECIFIED PATHOLOGICAL FRACTURE PRESENCE: ICD-10-CM

## 2022-08-30 DIAGNOSIS — I73.00 RAYNAUD'S DISEASE WITHOUT GANGRENE: ICD-10-CM

## 2022-08-30 DIAGNOSIS — M35.00 SICCA, UNSPECIFIED TYPE (HCC): ICD-10-CM

## 2022-08-30 DIAGNOSIS — K75.4 AUTOIMMUNE HEPATITIS (HCC): ICD-10-CM

## 2022-08-30 DIAGNOSIS — M34.1 CREST SYNDROME (HCC): ICD-10-CM

## 2022-08-30 DIAGNOSIS — E03.9 HYPOTHYROIDISM, UNSPECIFIED TYPE: ICD-10-CM

## 2022-08-30 PROCEDURE — 99214 OFFICE O/P EST MOD 30 MIN: CPT | Performed by: INTERNAL MEDICINE

## 2022-08-30 PROCEDURE — 99212 OFFICE O/P EST SF 10 MIN: CPT | Performed by: INTERNAL MEDICINE

## 2022-08-30 RX ORDER — CEVIMELINE HYDROCHLORIDE 30 MG/1
CAPSULE ORAL
Qty: 90 CAPSULE | Refills: 1 | Status: SHIPPED | OUTPATIENT
Start: 2022-08-30

## 2022-08-30 ASSESSMENT — FIBROSIS 4 INDEX: FIB4 SCORE: 1.2

## 2022-08-30 NOTE — PROGRESS NOTES
Chief Complaint- joint pain     Subjective:   Yaneli Gaspar is a 57 y.o. 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  Patient did have a positive rheumatoid factor which is now reverted to negative.  Patient states that she was diagnosed in 2010 and had symptoms for about 5 years prior to diagnosis.  Symptoms are predominantly Raynaud's phenomenon on fingers predominantly and a history of esophageal dysplasia/esophageal spasms which have not occurred for some time.  Patient currently treats most of her symptoms symptomatically, does have nitroglycerin ointment to use as needed.  Patient denies any shortness of breath, denies any chronic cough, denies any new rashes, denies any acid reflux, denies any digital tip ulcerations,    Comorbidities include sicca symptoms with poor dental issues, of note patient is SSA negative and SSB negative.    Patient states that she is followed by Dr. Molina in cardiology, follows Dr. Ornelas every 3 years last echocardiogram about December 2019  normal per patient report.       Patient states positive AMA, s/p eval by GI and s/p liver scan still ok per patient report     Patient also with osteoporosis by DEXA scan August 2020 for which patient does not want any treatment.      On physical exam also noted lymph node has been ongoing for about 6 months right lower jaw, patient already evaluated by ENT  who recommended further evaluation for which patient refused.     Other issues patient also complains of stress incontinence currently followed by urology.           PMHx  Hypothyroid  Tonsillectomy  Ovarian cyst removed  Bilateral breast implants     Fam Hx  F passed from oral cancer, hx of testicular cancer  M-hypothyroid, fibromyalgia  SX1- ulcerative  colitis  Daughter-celiac disease     Soc Hx  No tob  ETOH q 2 months  No blood tx  No tattoos  NO IVDA    Addendum 9/5/2023  APL neg 9/2023    Addendum 9/6/2022  APL IgG neg, IgM 31 IgA neg 9/2022  Beta 2 glycoprotein neg 9/2022  DRVVT neg 9/2022     Vitamin D 73 nl 12/2020   ANTHONY 1:160 6/2012 [facility]  Anti centromere >8.0 6/2012 [facility]; anticentromere ab 194 7/2021  AMA 96.6 1/2020; AMA 90.1 8/2020  dsDNA neg 6/2012 [facility]  RNP neg 6/2012 [facility]  Smith neg 6/2012 [facility]  SCL-70 neg6/2012 [facility]  SSA neg 6/2012 [facility]  SSB neg 6/2012 [facility]  WILL-1 neg 6/2012 [facility]  C3 91 nl 6/2012 [facility]  C4 22 nl 6/2012 [facility]  CCP neg 6/2012 [facility]  RA 16.1 7/2015 [facility]; RF neg 7/2021   Gliadin ab neg 10/2020  TPO neg 7/2020  Thyroglobulin ab neg 7/2020  DEXA 8/12/2020 T scores -3.2, -2.4  FRAX 8/12/2020 major osteoporotic fracture risk 13.0%, hip fracture risk 3.9%    Addendum 9/6/2022  DEXA 9/2/2022 T scores -2.6, -1.2  FRAX 9/2/2022 major osteoporotic fracture risk 10.3%, hip fracture risk 1.5%     DATE OF SERVICE:  12/09/2020   PULMONARY FUNCTION TEST INTERPRETATION  The patient had pulmonary function testing done on 12/09/2020.  The pulmonary   function testing showed an FEV1/FVC ratio of 81% predicted, which is normal   and does not represent any evidence of obstruction.  The total lung capacity   is also normal at 112% predicted, not consistent with any restrictive lung   disease.  The diffusion capacity is normal at 122% predicted, which is also   normal.  In summary, the pulmonary function testing including spirometry, the   flow-volume loops, the lung volumes, and the diffusion capacity are all within   normal limits.      Current Outpatient Medications   Medication Sig Dispense Refill    cevimeline (EVOXAC) 30 MG capsule 1 tab po tid for dry mouth 90 Capsule 1    desmopressin (DDAVP) 0.1 MG Tab       desmopressin (DDAVP) 0.01 % Solution       nitroglycerin (NITRO-BID) 2 % Ointment Nitro-Bid 2 % transdermal  ointment      progesterone (PROMETRIUM) 100 MG CAPS Take 100 mg by mouth every day.      Testosterone 10 MG/ACT (2%) GEL Apply  to skin as directed.      METHYLCOBALAMIN 1 mg by Does not apply route.      NS SOLN 50 mL with folic acid 5 MG/ML SOLN 10 mg 10 mg by Intravenous route Once. 50 mL     Potassium 99 MG TABS Take  by mouth.      Probiotic Product (SOLUBLE FIBER/PROBIOTICS PO) Take  by mouth.      MAGNESIUM ACETATE by Does not apply route.      THYROID PO Take  by mouth. COMPOUND T3 T4 1.5 GRAIN DAILY      Multiple Vitamin (DAILY MULTIVITAMIN) TABS Take  by mouth.      VITAMIN D PO Take  by mouth.      Ascorbic Acid (VITAMIN C PO) Take  by mouth.      FISH OIL by Does not apply route.      Methylsulfonylmethane (MSM PO) Take  by mouth.      azithromycin (ZITHROMAX) 250 MG Tab  (Patient not taking: No sig reported)      NALTREXONE HCL PO Take  by mouth. LDN (Patient not taking: Reported on 8/30/2022)      Transdermal Base CREA 2.5 mg by Apply externally route. (Patient not taking: No sig reported)      epinephrine (ADRENALIN) 0.1 MG/ML SOLN 2 mg by Injection route Once.      Apoaequorin (PREVAGEN PO) Take 40 mg by mouth. (Patient not taking: No sig reported)       No current facility-administered medications for this visit.     She  has a past medical history of Chronic fatigue, Infectious disease, Raynauds syndrome, Snoring, Unspecified disorder of thyroid, and Urinary bladder disorder.    ROS   Other than what is mentioned in HPI or physical exam, there is no history of headaches, double vision or blurred vision. No temporal tenderness or jaw claudication. No trouble swallowing difficulties or sore throats.  No chest complaints including chest pain, cough or sputum production. No GI complaints including nausea, vomiting, change in bowel habits, or past peptic ulcer disease. No history of blood in the stools. No urinary complaints including dysuria or frequency. No history of alopecia, photosensitivity, oral  ulcerations, Raynaud's phenomena.       Objective:     /60   Pulse 84   Temp 36.8 °C (98.2 °F) (Temporal)   Resp 12   Wt 50.8 kg (112 lb)   SpO2 98%  Body mass index is 19.84 kg/m².   Physical Exam:    Constitutional: Alert and oriented X3, patient is talkative with good eye contact.Skin: Warm, dry, good turgor, no rashes in visible areas, no hide bound skin seenEye: Equal, round and reactive, conjunctiva clear, lids normal EOM intactENMT: Lips without lesions, good dentition, no oropharyngeal ulcers, moist buccal mucosa, pinna without deformityNeck: Trachea midline, no masses, no thyromegaly.Lymph:  No cervical lymphadenopathy, no axillary lymphadenopathy, no supraclavicular lymphadenopathyRespiratory: Unlabored respiratory effort, lungs clear to auscultation, no wheezes, no ronchi.Cardiovascular: Normal S1, S2, Regular rate and rhythm, no murmurs rubs or gallops  .Abdomen: Soft, non-tender, no masses, no hepatosplenomegaly.Psych: Alert and oriented x3, normal affect and mood.Neuro: Cranial nerves 2-12 are grossly intact, no loss of sensation LEExt:no joint laxity noted in bilateral arms, no joint laxity noted in bilateral legs, no active Raynaud's seen today, no digital tip ulcerations, no petechial hemorrhages, no dactylitis, no sausage digits    Lab Results   Component Value Date/Time    SODIUM 138 03/11/2022 11:15 AM    POTASSIUM 4.7 03/11/2022 11:15 AM    CHLORIDE 101 03/11/2022 11:15 AM    CO2 25 03/11/2022 11:15 AM    GLUCOSE 87 03/11/2022 11:15 AM    BUN 15 03/11/2022 11:15 AM    CREATININE 0.81 03/11/2022 11:15 AM      Lab Results   Component Value Date/Time    WBC 5.7 03/11/2022 11:15 AM    RBC 4.63 03/11/2022 11:15 AM    HEMOGLOBIN 14.0 03/11/2022 11:15 AM    HEMATOCRIT 42.8 03/11/2022 11:15 AM    MCV 92.4 03/11/2022 11:15 AM    MCH 30.2 03/11/2022 11:15 AM    MCHC 32.7 (L) 03/11/2022 11:15 AM    MPV 10.1 03/11/2022 11:15 AM    NEUTSPOLYS 55.40 03/11/2022 11:15 AM    LYMPHOCYTES 29.00  03/11/2022 11:15 AM    MONOCYTES 8.10 03/11/2022 11:15 AM    EOSINOPHILS 5.40 03/11/2022 11:15 AM    BASOPHILS 1.90 (H) 03/11/2022 11:15 AM      Lab Results   Component Value Date/Time    CALCIUM 9.4 03/11/2022 11:15 AM    ASTSGOT 23 03/11/2022 11:15 AM    ALTSGPT 10 03/11/2022 11:15 AM    ALKPHOSPHAT 56 03/11/2022 11:15 AM    TBILIRUBIN 0.5 03/11/2022 11:15 AM    ALBUMIN 5.0 (H) 03/11/2022 11:15 AM    TOTPROTEIN 7.3 03/11/2022 11:15 AM     Lab Results   Component Value Date/Time    URICACID 3.5 07/23/2019 02:29 PM    RHEUMFACTN 12 07/06/2021 01:54 PM    ANTINUCAB Detected (A) 07/31/2020 11:16 AM     Lab Results   Component Value Date/Time    CENTROMBAB 194 (H) 07/06/2021 01:54 PM     Lab Results   Component Value Date/Time    SEDRATEWES 4 07/31/2020 11:16 AM     Lab Results   Component Value Date/Time    HBA1C 4.8 03/11/2022 11:15 AM     Lab Results   Component Value Date/Time    MICROSOMALA <9.0 05/05/2022 12:57 PM    ANTITHYROGL <0.9 05/05/2022 12:57 PM     Lab Results   Component Value Date/Time    ANTIMITOCHO 73.1 (H) 07/06/2021 01:56 PM     Lab Results   Component Value Date/Time    PTHINTACT 30.7 10/07/2020 05:20 PM         Assessment and Plan:     1. CREST syndrome (HCC)  With manifestations of Raynaud's and esophageal spasms with rare esophageal spasms, patient continues to use conservative measures refuses calcium channel blockers, does have nitroglycerin ointment to use as needed  - cevimeline (EVOXAC) 30 MG capsule; 1 tab po tid for dry mouth  Dispense: 90 Capsule; Refill: 1  - ANTICARDIOLIPIN AB IGG,IGM,IGA; Future  - BETA-2 GLYCOPROTEIN I AB,G,A,M  - DRVVT CONFIRM; Future    2. Raynaud's disease without gangrene  Patient uses conservative measures, does have nitroglycerin ointment on hand as needed, today we will check cardiolipin antibody antibodies as well as beta-2 glycoprotein's and DRV VT to assure no other pathology associated with the Raynaud's  - cevimeline (EVOXAC) 30 MG capsule; 1 tab po tid  for dry mouth  Dispense: 90 Capsule; Refill: 1  - ANTICARDIOLIPIN AB IGG,IGM,IGA; Future  - BETA-2 GLYCOPROTEIN I AB,G,A,M  - DRVVT CONFIRM; Future    3. Sicca, unspecified type (HCC)  Patient having a lot of problems with her teeth secondary to dry mouth, we opted to do a trial of evoxac 30 mg p.o. 3 times daily to see if that might help  - cevimeline (EVOXAC) 30 MG capsule; 1 tab po tid for dry mouth  Dispense: 90 Capsule; Refill: 1  - ANTICARDIOLIPIN AB IGG,IGM,IGA; Future  - BETA-2 GLYCOPROTEIN I AB,G,A,M  - DRVVT CONFIRM; Future    4. Autoimmune hepatitis (HCC)  Currently followed by GI liver functions have been stable  - ANTICARDIOLIPIN AB IGG,IGM,IGA; Future  - BETA-2 GLYCOPROTEIN I AB,G,A,M  - DRVVT CONFIRM; Future    5. Osteoporosis, unspecified osteoporosis type, unspecified pathological fracture presence  Last DEXA August 2020 patient due for DEXA August 2022, DEXA had been ordered for patient, patient states she has a DEXA scheduled for her this week   continue calcium citrate 1200 mg by mouth daily and vitamin D about 2000 units by mouth daily and magnesium 200 mg by mouth daily  - ANTICARDIOLIPIN AB IGG,IGM,IGA; Future  - BETA-2 GLYCOPROTEIN I AB,G,A,M  - DRVVT CONFIRM; Future    6. Hypothyroidism, unspecified type  Followed by patients PCP, can exacerbate joint pain, I recommend monitoring thyroid on a regular basis to assure it is not contributing to the patient's joint pain  - ANTICARDIOLIPIN AB IGG,IGM,IGA; Future  - BETA-2 GLYCOPROTEIN I AB,G,A,M  - DRVVT CONFIRM; Future    Followup: Return in about 1 year (around 8/30/2023). or sooner fabiola Gaspar  was seen 30 minutes face-to-face of which more than 50% of the time was spent counseling the patient (excluding time for procedures)  regarding  rheumatological condition and care. Therapy was discussed in detail.      Please note that this dictation was created using voice recognition software. I have made every reasonable attempt to  correct obvious errors, but I expect that there are errors of grammar and possibly content that I did not discover before finalizing the note.

## 2022-09-02 ENCOUNTER — HOSPITAL ENCOUNTER (OUTPATIENT)
Dept: RADIOLOGY | Facility: MEDICAL CENTER | Age: 57
End: 2022-09-02
Attending: INTERNAL MEDICINE
Payer: COMMERCIAL

## 2022-09-02 ENCOUNTER — HOSPITAL ENCOUNTER (OUTPATIENT)
Dept: LAB | Facility: MEDICAL CENTER | Age: 57
End: 2022-09-02
Attending: INTERNAL MEDICINE
Payer: COMMERCIAL

## 2022-09-02 DIAGNOSIS — M35.00 SICCA, UNSPECIFIED TYPE (HCC): ICD-10-CM

## 2022-09-02 DIAGNOSIS — M81.0 OSTEOPOROSIS, UNSPECIFIED OSTEOPOROSIS TYPE, UNSPECIFIED PATHOLOGICAL FRACTURE PRESENCE: ICD-10-CM

## 2022-09-02 DIAGNOSIS — K75.4 AUTOIMMUNE HEPATITIS (HCC): ICD-10-CM

## 2022-09-02 DIAGNOSIS — I73.00 RAYNAUD'S DISEASE WITHOUT GANGRENE: ICD-10-CM

## 2022-09-02 DIAGNOSIS — M34.1 CREST SYNDROME (HCC): ICD-10-CM

## 2022-09-02 DIAGNOSIS — E03.9 HYPOTHYROIDISM, UNSPECIFIED TYPE: ICD-10-CM

## 2022-09-02 PROCEDURE — 86147 CARDIOLIPIN ANTIBODY EA IG: CPT | Mod: 91

## 2022-09-02 PROCEDURE — 36415 COLL VENOUS BLD VENIPUNCTURE: CPT

## 2022-09-02 PROCEDURE — 77080 DXA BONE DENSITY AXIAL: CPT

## 2022-09-02 PROCEDURE — 86146 BETA-2 GLYCOPROTEIN ANTIBODY: CPT | Mod: 91

## 2022-09-02 PROCEDURE — 85613 RUSSELL VIPER VENOM DILUTED: CPT

## 2022-09-04 LAB
B2 GLYCOPROT1 IGA SER-ACNC: <10 SAU
B2 GLYCOPROT1 IGG SERPL IA-ACNC: <10 SGU
B2 GLYCOPROT1 IGM SERPL IA-ACNC: 16 SMU
CARDIOLIPIN IGA SER IA-ACNC: <10 APL
CARDIOLIPIN IGG SER IA-ACNC: <10 GPL
CARDIOLIPIN IGM SER IA-ACNC: 31 MPL

## 2022-09-06 ENCOUNTER — TELEPHONE (OUTPATIENT)
Dept: RHEUMATOLOGY | Facility: MEDICAL CENTER | Age: 57
End: 2022-09-06

## 2022-09-06 DIAGNOSIS — R76.0 ANTI-CARDIOLIPIN ANTIBODY POSITIVE: ICD-10-CM

## 2022-09-06 NOTE — TELEPHONE ENCOUNTER
Please notify patient that we received the results of her bone density scan and it did show osteoporosis, it has improved however since her last bone density scan, since she does have osteoporosis we do recommend treatment, patient currently not on any treatment recommend patient schedule follow-up appointment so we can discuss treatment options    Additionally, we did check for cardiolipin antibody which is a protein that can cause spontaneous blood clots, observe for test, only 1 came back low positive, it does require rechecking to assure that is a true positive and not a false positive, recommend taking baby aspirin a day as prophylaxis against blood clots, and we should recheck the blood test again in about 3 months, lab order in the computer, patient does not need to be fasting.  Do not check any soooner than 3 months however as then the blood test would not be a valid result.    If patient has questions, please schedule a f/u appointment

## 2022-09-07 LAB — MISCELLANEOUS LAB RESULT MISCLAB: ABNORMAL

## 2022-10-17 ENCOUNTER — HOSPITAL ENCOUNTER (OUTPATIENT)
Dept: LAB | Facility: MEDICAL CENTER | Age: 57
End: 2022-10-17
Attending: FAMILY MEDICINE
Payer: COMMERCIAL

## 2022-10-18 ENCOUNTER — HOSPITAL ENCOUNTER (OUTPATIENT)
Dept: LAB | Facility: MEDICAL CENTER | Age: 57
End: 2022-10-18
Attending: FAMILY MEDICINE
Payer: COMMERCIAL

## 2022-10-18 LAB
25(OH)D3 SERPL-MCNC: 67 NG/ML (ref 30–100)
ALBUMIN SERPL BCP-MCNC: 4.6 G/DL (ref 3.2–4.9)
ALBUMIN/GLOB SERPL: 1.8 G/DL
ALP SERPL-CCNC: 46 U/L (ref 30–99)
ALT SERPL-CCNC: 12 U/L (ref 2–50)
ANION GAP SERPL CALC-SCNC: 12 MMOL/L (ref 7–16)
AST SERPL-CCNC: 16 U/L (ref 12–45)
BASOPHILS # BLD AUTO: 1.1 % (ref 0–1.8)
BASOPHILS # BLD: 0.07 K/UL (ref 0–0.12)
BILIRUB SERPL-MCNC: 0.5 MG/DL (ref 0.1–1.5)
BUN SERPL-MCNC: 14 MG/DL (ref 8–22)
CALCIUM SERPL-MCNC: 8.8 MG/DL (ref 8.5–10.5)
CHLORIDE SERPL-SCNC: 98 MMOL/L (ref 96–112)
CO2 SERPL-SCNC: 24 MMOL/L (ref 20–33)
CREAT SERPL-MCNC: 0.51 MG/DL (ref 0.5–1.4)
CRP SERPL HS-MCNC: 0.3 MG/L (ref 0–3)
DHEA-S SERPL-MCNC: 178 UG/DL (ref 18.9–205)
EOSINOPHIL # BLD AUTO: 0.08 K/UL (ref 0–0.51)
EOSINOPHIL NFR BLD: 1.3 % (ref 0–6.9)
ERYTHROCYTE [DISTWIDTH] IN BLOOD BY AUTOMATED COUNT: 43.8 FL (ref 35.9–50)
EST. AVERAGE GLUCOSE BLD GHB EST-MCNC: 97 MG/DL
ESTRADIOL SERPL-MCNC: 14.1 PG/ML
FASTING STATUS PATIENT QL REPORTED: NORMAL
GFR SERPLBLD CREATININE-BSD FMLA CKD-EPI: 108 ML/MIN/1.73 M 2
GLOBULIN SER CALC-MCNC: 2.5 G/DL (ref 1.9–3.5)
GLUCOSE SERPL-MCNC: 93 MG/DL (ref 65–99)
HBA1C MFR BLD: 5 % (ref 4–5.6)
HCT VFR BLD AUTO: 43.3 % (ref 37–47)
HGB BLD-MCNC: 14.5 G/DL (ref 12–16)
IMM GRANULOCYTES # BLD AUTO: 0.03 K/UL (ref 0–0.11)
IMM GRANULOCYTES NFR BLD AUTO: 0.5 % (ref 0–0.9)
LYMPHOCYTES # BLD AUTO: 1.39 K/UL (ref 1–4.8)
LYMPHOCYTES NFR BLD: 22.5 % (ref 22–41)
MCH RBC QN AUTO: 30.1 PG (ref 27–33)
MCHC RBC AUTO-ENTMCNC: 33.5 G/DL (ref 33.6–35)
MCV RBC AUTO: 90 FL (ref 81.4–97.8)
MONOCYTES # BLD AUTO: 0.45 K/UL (ref 0–0.85)
MONOCYTES NFR BLD AUTO: 7.3 % (ref 0–13.4)
NEUTROPHILS # BLD AUTO: 4.15 K/UL (ref 2–7.15)
NEUTROPHILS NFR BLD: 67.3 % (ref 44–72)
NRBC # BLD AUTO: 0 K/UL
NRBC BLD-RTO: 0 /100 WBC
PLATELET # BLD AUTO: 395 K/UL (ref 164–446)
PMV BLD AUTO: 9.3 FL (ref 9–12.9)
POTASSIUM SERPL-SCNC: 4.4 MMOL/L (ref 3.6–5.5)
PROGEST SERPL-MCNC: 3.32 NG/ML
PROLACTIN SERPL-MCNC: 8.53 NG/ML (ref 2.8–26)
PROT SERPL-MCNC: 7.1 G/DL (ref 6–8.2)
PTH-INTACT SERPL-MCNC: 35.7 PG/ML (ref 14–72)
RBC # BLD AUTO: 4.81 M/UL (ref 4.2–5.4)
SODIUM SERPL-SCNC: 134 MMOL/L (ref 135–145)
T3FREE SERPL-MCNC: 1.99 PG/ML (ref 2–4.4)
TSH SERPL DL<=0.005 MIU/L-ACNC: 1.43 UIU/ML (ref 0.38–5.33)
WBC # BLD AUTO: 6.2 K/UL (ref 4.8–10.8)

## 2022-10-18 PROCEDURE — 84146 ASSAY OF PROLACTIN: CPT

## 2022-10-18 PROCEDURE — 86381 MITOCHONDRIAL ANTIBODY EACH: CPT

## 2022-10-18 PROCEDURE — 84402 ASSAY OF FREE TESTOSTERONE: CPT

## 2022-10-18 PROCEDURE — 83970 ASSAY OF PARATHORMONE: CPT

## 2022-10-18 PROCEDURE — 82627 DEHYDROEPIANDROSTERONE: CPT

## 2022-10-18 PROCEDURE — 85025 COMPLETE CBC W/AUTO DIFF WBC: CPT

## 2022-10-18 PROCEDURE — 86141 C-REACTIVE PROTEIN HS: CPT

## 2022-10-18 PROCEDURE — 80053 COMPREHEN METABOLIC PANEL: CPT

## 2022-10-18 PROCEDURE — 36415 COLL VENOUS BLD VENIPUNCTURE: CPT

## 2022-10-18 PROCEDURE — 83704 LIPOPROTEIN BLD QUAN PART: CPT

## 2022-10-18 PROCEDURE — 84482 T3 REVERSE: CPT

## 2022-10-18 PROCEDURE — 84481 FREE ASSAY (FT-3): CPT

## 2022-10-18 PROCEDURE — 82306 VITAMIN D 25 HYDROXY: CPT

## 2022-10-18 PROCEDURE — 83036 HEMOGLOBIN GLYCOSYLATED A1C: CPT

## 2022-10-18 PROCEDURE — 84140 ASSAY OF PREGNENOLONE: CPT

## 2022-10-18 PROCEDURE — 84403 ASSAY OF TOTAL TESTOSTERONE: CPT

## 2022-10-18 PROCEDURE — 80061 LIPID PANEL: CPT

## 2022-10-18 PROCEDURE — 82670 ASSAY OF TOTAL ESTRADIOL: CPT

## 2022-10-18 PROCEDURE — 84588 ASSAY OF VASOPRESSIN: CPT

## 2022-10-18 PROCEDURE — 84305 ASSAY OF SOMATOMEDIN: CPT

## 2022-10-18 PROCEDURE — 82679 ASSAY OF ESTRONE: CPT

## 2022-10-18 PROCEDURE — 83525 ASSAY OF INSULIN: CPT

## 2022-10-18 PROCEDURE — 84270 ASSAY OF SEX HORMONE GLOBUL: CPT

## 2022-10-18 PROCEDURE — 84443 ASSAY THYROID STIM HORMONE: CPT

## 2022-10-18 PROCEDURE — 84144 ASSAY OF PROGESTERONE: CPT

## 2022-10-20 LAB
IGF-I SERPL-MCNC: 70 NG/ML (ref 46–238)
IGF-I Z-SCORE SERPL: -1.2
INSULIN P FAST SERPL-ACNC: 3 UIU/ML (ref 3–25)

## 2022-10-21 LAB — MITOCHONDRIA M2 IGG SER-ACNC: 72.4 UNITS (ref 0–24.9)

## 2022-10-22 LAB
CHOLEST SERPL-MCNC: 209 MG/DL
ESTRONE SERPL-MCNC: 58.2 PG/ML
HDL PARTICAL NO Q4363: 36.4 UMOL/L
HDL SIZE Q4361: 10.5 NM
HDLC SERPL-MCNC: 92 MG/DL (ref 40–59)
HLD.LARGE SERPL-SCNC: >17 UMOL/L
L VLDL PART NO Q4357: <1.5 NMOL/L
LDL SERPL QN: 21.6 NM
LDL SERPL-SCNC: 954 NMOL/L
LDL SMALL SERPL-SCNC: <165 NMOL/L
LDLC SERPL CALC-MCNC: 107 MG/DL
PATHOLOGY STUDY: ABNORMAL
TRIGL SERPL-MCNC: 50 MG/DL (ref 30–149)
VLDL SIZE Q4362: 50.3 NM

## 2022-10-23 LAB
PREG SERPL-MCNC: 63 NG/DL (ref 15–132)
T3REVERSE SERPL-MCNC: 16.3 NG/DL (ref 9–27)

## 2022-10-24 LAB
SHBG SERPL-SCNC: 194 NMOL/L (ref 17–125)
TESTOST FREE SERPL-MCNC: 1.4 PG/ML (ref 0.6–3.8)
TESTOST SERPL-MCNC: 31 NG/DL (ref 9–55)

## 2022-10-26 LAB — MISCELLANEOUS LAB RESULT MISCLAB: NORMAL

## 2023-07-10 ENCOUNTER — HOSPITAL ENCOUNTER (OUTPATIENT)
Dept: LAB | Facility: MEDICAL CENTER | Age: 58
End: 2023-07-10
Attending: FAMILY MEDICINE
Payer: COMMERCIAL

## 2023-07-10 LAB
ALBUMIN SERPL BCP-MCNC: 4.8 G/DL (ref 3.2–4.9)
ALBUMIN/GLOB SERPL: 1.7 G/DL
ALP SERPL-CCNC: 52 U/L (ref 30–99)
ALT SERPL-CCNC: 16 U/L (ref 2–50)
ANION GAP SERPL CALC-SCNC: 13 MMOL/L (ref 7–16)
AST SERPL-CCNC: 14 U/L (ref 12–45)
BASOPHILS # BLD AUTO: 1.4 % (ref 0–1.8)
BASOPHILS # BLD: 0.11 K/UL (ref 0–0.12)
BILIRUB SERPL-MCNC: 0.6 MG/DL (ref 0.1–1.5)
BUN SERPL-MCNC: 16 MG/DL (ref 8–22)
CALCIUM ALBUM COR SERPL-MCNC: 8.8 MG/DL (ref 8.5–10.5)
CALCIUM SERPL-MCNC: 9.4 MG/DL (ref 8.5–10.5)
CHLORIDE SERPL-SCNC: 101 MMOL/L (ref 96–112)
CHOLEST SERPL-MCNC: 269 MG/DL (ref 100–199)
CO2 SERPL-SCNC: 25 MMOL/L (ref 20–33)
CREAT SERPL-MCNC: 0.63 MG/DL (ref 0.5–1.4)
CRP SERPL HS-MCNC: 0.5 MG/L (ref 0–3)
EOSINOPHIL # BLD AUTO: 0.12 K/UL (ref 0–0.51)
EOSINOPHIL NFR BLD: 1.6 % (ref 0–6.9)
ERYTHROCYTE [DISTWIDTH] IN BLOOD BY AUTOMATED COUNT: 45.9 FL (ref 35.9–50)
EST. AVERAGE GLUCOSE BLD GHB EST-MCNC: 100 MG/DL
ESTRADIOL SERPL-MCNC: 20.7 PG/ML
GFR SERPLBLD CREATININE-BSD FMLA CKD-EPI: 102 ML/MIN/1.73 M 2
GLOBULIN SER CALC-MCNC: 2.9 G/DL (ref 1.9–3.5)
GLUCOSE SERPL-MCNC: 90 MG/DL (ref 65–99)
HBA1C MFR BLD: 5.1 % (ref 4–5.6)
HCT VFR BLD AUTO: 45.4 % (ref 37–47)
HCYS SERPL-SCNC: 6.02 UMOL/L
HDLC SERPL-MCNC: 112 MG/DL
HGB BLD-MCNC: 15 G/DL (ref 12–16)
IMM GRANULOCYTES # BLD AUTO: 0.02 K/UL (ref 0–0.11)
IMM GRANULOCYTES NFR BLD AUTO: 0.3 % (ref 0–0.9)
LDLC SERPL CALC-MCNC: 148 MG/DL
LYMPHOCYTES # BLD AUTO: 2.06 K/UL (ref 1–4.8)
LYMPHOCYTES NFR BLD: 26.7 % (ref 22–41)
MCH RBC QN AUTO: 30.2 PG (ref 27–33)
MCHC RBC AUTO-ENTMCNC: 33 G/DL (ref 32.2–35.5)
MCV RBC AUTO: 91.3 FL (ref 81.4–97.8)
MONOCYTES # BLD AUTO: 0.63 K/UL (ref 0–0.85)
MONOCYTES NFR BLD AUTO: 8.2 % (ref 0–13.4)
NEUTROPHILS # BLD AUTO: 4.77 K/UL (ref 1.82–7.42)
NEUTROPHILS NFR BLD: 61.8 % (ref 44–72)
NRBC # BLD AUTO: 0 K/UL
NRBC BLD-RTO: 0 /100 WBC (ref 0–0.2)
PLATELET # BLD AUTO: 392 K/UL (ref 164–446)
PMV BLD AUTO: 9.4 FL (ref 9–12.9)
POTASSIUM SERPL-SCNC: 3.9 MMOL/L (ref 3.6–5.5)
PROT SERPL-MCNC: 7.7 G/DL (ref 6–8.2)
RBC # BLD AUTO: 4.97 M/UL (ref 4.2–5.4)
SODIUM SERPL-SCNC: 139 MMOL/L (ref 135–145)
TRIGL SERPL-MCNC: 46 MG/DL (ref 0–149)
WBC # BLD AUTO: 7.7 K/UL (ref 4.8–10.8)

## 2023-07-10 PROCEDURE — 84588 ASSAY OF VASOPRESSIN: CPT

## 2023-07-10 PROCEDURE — 85025 COMPLETE CBC W/AUTO DIFF WBC: CPT

## 2023-07-10 PROCEDURE — 84270 ASSAY OF SEX HORMONE GLOBUL: CPT

## 2023-07-10 PROCEDURE — 86141 C-REACTIVE PROTEIN HS: CPT

## 2023-07-10 PROCEDURE — 83525 ASSAY OF INSULIN: CPT

## 2023-07-10 PROCEDURE — 86381 MITOCHONDRIAL ANTIBODY EACH: CPT

## 2023-07-10 PROCEDURE — 82679 ASSAY OF ESTRONE: CPT

## 2023-07-10 PROCEDURE — 84403 ASSAY OF TOTAL TESTOSTERONE: CPT

## 2023-07-10 PROCEDURE — 82670 ASSAY OF TOTAL ESTRADIOL: CPT

## 2023-07-10 PROCEDURE — 84482 T3 REVERSE: CPT

## 2023-07-10 PROCEDURE — 83036 HEMOGLOBIN GLYCOSYLATED A1C: CPT

## 2023-07-10 PROCEDURE — 82627 DEHYDROEPIANDROSTERONE: CPT

## 2023-07-10 PROCEDURE — 84305 ASSAY OF SOMATOMEDIN: CPT

## 2023-07-10 PROCEDURE — 84443 ASSAY THYROID STIM HORMONE: CPT

## 2023-07-10 PROCEDURE — 84146 ASSAY OF PROLACTIN: CPT

## 2023-07-10 PROCEDURE — 80061 LIPID PANEL: CPT

## 2023-07-10 PROCEDURE — 84144 ASSAY OF PROGESTERONE: CPT

## 2023-07-10 PROCEDURE — 84481 FREE ASSAY (FT-3): CPT

## 2023-07-10 PROCEDURE — 80053 COMPREHEN METABOLIC PANEL: CPT

## 2023-07-10 PROCEDURE — 36415 COLL VENOUS BLD VENIPUNCTURE: CPT

## 2023-07-10 PROCEDURE — 83090 ASSAY OF HOMOCYSTEINE: CPT

## 2023-07-10 PROCEDURE — 84402 ASSAY OF FREE TESTOSTERONE: CPT

## 2023-07-10 PROCEDURE — 83520 IMMUNOASSAY QUANT NOS NONAB: CPT

## 2023-07-10 PROCEDURE — 82306 VITAMIN D 25 HYDROXY: CPT

## 2023-07-11 LAB
25(OH)D3 SERPL-MCNC: 72 NG/ML (ref 30–100)
DHEA-S SERPL-MCNC: 66.7 UG/DL (ref 18.9–205)
PROGEST SERPL-MCNC: 0.41 NG/ML
PROLACTIN SERPL-MCNC: 12.3 NG/ML (ref 2.8–26)
T3FREE SERPL-MCNC: 2.16 PG/ML (ref 2–4.4)
TSH SERPL DL<=0.005 MIU/L-ACNC: 3.89 UIU/ML (ref 0.38–5.33)

## 2023-07-12 LAB
IGF-I SERPL-MCNC: 77 NG/ML (ref 44–240)
IGF-I Z-SCORE SERPL: -1
INSULIN P FAST SERPL-ACNC: 3 UIU/ML (ref 3–25)
MITOCHONDRIA M2 IGG SER-ACNC: 56.7 UNITS (ref 0–24.9)

## 2023-07-13 LAB
ESTRONE SERPL-MCNC: 83.9 PG/ML
SHBG SERPL-SCNC: 215 NMOL/L (ref 17–125)
TESTOST FREE SERPL-MCNC: 0.9 PG/ML (ref 0.6–3.8)
TESTOST SERPL-MCNC: 21 NG/DL (ref 9–55)

## 2023-07-14 LAB — MISCELLANEOUS LAB RESULT MISCLAB: NORMAL

## 2023-07-15 LAB — T3REVERSE SERPL-MCNC: 18.7 NG/DL (ref 9–27)

## 2023-07-17 LAB — MISCELLANEOUS LAB RESULT MISCLAB: NORMAL

## 2023-08-31 ENCOUNTER — HOSPITAL ENCOUNTER (OUTPATIENT)
Dept: LAB | Facility: MEDICAL CENTER | Age: 58
End: 2023-08-31
Attending: INTERNAL MEDICINE
Payer: COMMERCIAL

## 2023-08-31 DIAGNOSIS — R76.0 ANTI-CARDIOLIPIN ANTIBODY POSITIVE: ICD-10-CM

## 2023-08-31 PROCEDURE — 36415 COLL VENOUS BLD VENIPUNCTURE: CPT

## 2023-08-31 PROCEDURE — 86147 CARDIOLIPIN ANTIBODY EA IG: CPT | Mod: 91

## 2023-09-03 LAB
CARDIOLIPIN IGA SER IA-ACNC: <10 APL
CARDIOLIPIN IGG SER IA-ACNC: <10 GPL
CARDIOLIPIN IGM SER IA-ACNC: 12 MPL

## 2023-09-25 ENCOUNTER — OFFICE VISIT (OUTPATIENT)
Dept: RHEUMATOLOGY | Facility: MEDICAL CENTER | Age: 58
End: 2023-09-25
Attending: INTERNAL MEDICINE
Payer: COMMERCIAL

## 2023-09-25 ENCOUNTER — APPOINTMENT (OUTPATIENT)
Dept: RADIOLOGY | Facility: MEDICAL CENTER | Age: 58
End: 2023-09-25
Attending: FAMILY MEDICINE
Payer: COMMERCIAL

## 2023-09-25 VITALS
BODY MASS INDEX: 20.37 KG/M2 | SYSTOLIC BLOOD PRESSURE: 110 MMHG | DIASTOLIC BLOOD PRESSURE: 60 MMHG | HEART RATE: 72 BPM | RESPIRATION RATE: 14 BRPM | WEIGHT: 115 LBS | TEMPERATURE: 98.4 F

## 2023-09-25 DIAGNOSIS — I73.00 RAYNAUD'S DISEASE WITHOUT GANGRENE: ICD-10-CM

## 2023-09-25 DIAGNOSIS — R41.89 AKINETIC MUTISM: ICD-10-CM

## 2023-09-25 DIAGNOSIS — M81.0 OSTEOPOROSIS, UNSPECIFIED OSTEOPOROSIS TYPE, UNSPECIFIED PATHOLOGICAL FRACTURE PRESENCE: ICD-10-CM

## 2023-09-25 DIAGNOSIS — E03.9 HYPOTHYROIDISM, UNSPECIFIED TYPE: ICD-10-CM

## 2023-09-25 DIAGNOSIS — K75.4 AUTOIMMUNE HEPATITIS (HCC): ICD-10-CM

## 2023-09-25 DIAGNOSIS — M35.00 SICCA, UNSPECIFIED TYPE (HCC): ICD-10-CM

## 2023-09-25 DIAGNOSIS — M34.1 CREST SYNDROME (HCC): ICD-10-CM

## 2023-09-25 PROCEDURE — 3074F SYST BP LT 130 MM HG: CPT | Performed by: INTERNAL MEDICINE

## 2023-09-25 PROCEDURE — 99214 OFFICE O/P EST MOD 30 MIN: CPT | Performed by: INTERNAL MEDICINE

## 2023-09-25 PROCEDURE — 70553 MRI BRAIN STEM W/O & W/DYE: CPT

## 2023-09-25 PROCEDURE — 700117 HCHG RX CONTRAST REV CODE 255: Performed by: FAMILY MEDICINE

## 2023-09-25 PROCEDURE — 3078F DIAST BP <80 MM HG: CPT | Performed by: INTERNAL MEDICINE

## 2023-09-25 PROCEDURE — 99212 OFFICE O/P EST SF 10 MIN: CPT | Performed by: INTERNAL MEDICINE

## 2023-09-25 PROCEDURE — A9579 GAD-BASE MR CONTRAST NOS,1ML: HCPCS | Performed by: FAMILY MEDICINE

## 2023-09-25 RX ADMIN — GADOTERIDOL 10 ML: 279.3 INJECTION, SOLUTION INTRAVENOUS at 16:00

## 2023-09-25 ASSESSMENT — FIBROSIS 4 INDEX: FIB4 SCORE: 0.53

## 2023-09-25 ASSESSMENT — PATIENT HEALTH QUESTIONNAIRE - PHQ9: CLINICAL INTERPRETATION OF PHQ2 SCORE: 0

## 2023-09-25 NOTE — PROGRESS NOTES
Chief Complaint- joint pain     Subjective:   Yaneli Gaspar is a 58 y.o. 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  Patient's primary issues are polyarthralgias, Raynaud's and intermittent swallowing difficulties.  Patient was last seen about 1 year ago, states he Raynaud's is about stable, denies any digital tip ulcerations either on fingertips or toe tips.  Patient states swallowing difficulties have not been an issue for patient.  Patient treats most of her symptoms symptomatically, patient does have nitroglycerin ointment to use as needed but rarely uses.    Since last visit patient was hospitalized for pneumonia but otherwise denies any current shortness of breath, denies any chronic cough denies any new rashes denies GERD denies digital tip ulcerations.     Comorbidities include sicca symptoms with poor dental issues, of note patient is SSA negative and SSB negative.     Patient states that she is followed by Dr. Molina in cardiology, follows Dr. Ornelas every 3 years last echocardiogram about December 2019  normal per patient report.       Patient states positive AMA, s/p eval by GI and s/p liver scan still ok per patient report     Patient also with osteoporosis by DEXA scan August 2020 for which patient does not want any treatment.      On physical exam also noted lymph node has been ongoing for about 6 months right lower jaw, patient already evaluated by ENT  who recommended further evaluation for which patient refused.     Other issues patient also complains of stress incontinence currently followed by urology.           PMHx  Hypothyroid  Tonsillectomy  Ovarian cyst removed  Bilateral breast implants     Fam Hx  F passed from oral cancer, hx of testicular cancer  M-hypothyroid,  fibromyalgia  SX1- ulcerative colitis  Daughter-celiac disease     Soc Hx  No tob  ETOH q 2 months  No blood tx  No tattoos  NO IVDA    S/p evoxac-headache        APL IgG neg, IgM 31 IgA neg 9/2022; APL neg 9/2023  Beta 2 glycoprotein neg 9/2022  DRVVT neg 9/2022  Vitamin D 73 nl 12/2020   ANTHONY 1:160 6/2012 [facility]  Anti centromere >8.0 6/2012 [facility]; anticentromere ab 194 7/2021  AMA 96.6 1/2020; AMA 90.1 8/2020  dsDNA neg 6/2012 [facility]  RNP neg 6/2012 [facility]  Smith neg 6/2012 [facility]  SCL-70 neg6/2012 [facility]  SSA neg 6/2012 [facility]  SSB neg 6/2012 [facility]  WILL-1 neg 6/2012 [facility]  C3 91 nl 6/2012 [facility]  C4 22 nl 6/2012 [facility]  CCP neg 6/2012 [facility]  RA 16.1 7/2015 [facility]; RF neg 7/2021   Gliadin ab neg 10/2020  TPO neg 7/2020  Thyroglobulin ab neg 7/2020  DEXA 8/12/2020 T scores -3.2, -2.4  FRAX 8/12/2020 major osteoporotic fracture risk 13.0%, hip fracture risk 3.9%  DEXA 9/2/2022 T scores -2.6, -1.2  FRAX 9/2/2022 major osteoporotic fracture risk 10.3%, hip fracture risk 1.5%     DATE OF SERVICE:  12/09/2020   PULMONARY FUNCTION TEST INTERPRETATION  The patient had pulmonary function testing done on 12/09/2020.  The pulmonary   function testing showed an FEV1/FVC ratio of 81% predicted, which is normal   and does not represent any evidence of obstruction.  The total lung capacity   is also normal at 112% predicted, not consistent with any restrictive lung   disease.  The diffusion capacity is normal at 122% predicted, which is also   normal.  In summary, the pulmonary function testing including spirometry, the   flow-volume loops, the lung volumes, and the diffusion capacity are all within   normal limits.      Current Outpatient Medications   Medication Sig Dispense Refill    estrogens, conjugated (PREMARIN) 0.625 MG/GM Cream Insert 0.5 g into the vagina every day.      desmopressin (DDAVP) 0.1 MG Tab       desmopressin (DDAVP) 0.01 % Solution       progesterone (PROMETRIUM) 100 MG CAPS Take 100 mg by  mouth every day.      Testosterone 10 MG/ACT (2%) GEL Apply  to skin as directed.      METHYLCOBALAMIN 1 mg by Does not apply route.      Probiotic Product (SOLUBLE FIBER/PROBIOTICS PO) Take  by mouth.      MAGNESIUM ACETATE by Does not apply route.      THYROID PO Take  by mouth. COMPOUND T3 T4 1.5 GRAIN DAILY      Multiple Vitamin (DAILY MULTIVITAMIN) TABS Take  by mouth.      VITAMIN D PO Take  by mouth.      Ascorbic Acid (VITAMIN C PO) Take  by mouth.      FISH OIL by Does not apply route.      Methylsulfonylmethane (MSM PO) Take  by mouth.      cevimeline (EVOXAC) 30 MG capsule 1 tab po tid for dry mouth (Patient not taking: Reported on 9/25/2023) 90 Capsule 1    azithromycin (ZITHROMAX) 250 MG Tab  (Patient not taking: No sig reported)      nitroglycerin (NITRO-BID) 2 % Ointment Nitro-Bid 2 % transdermal ointment (Patient not taking: Reported on 9/25/2023)      NALTREXONE HCL PO Take  by mouth. LDN (Patient not taking: Reported on 8/30/2022)      Transdermal Base CREA 2.5 mg by Apply externally route. (Patient not taking: No sig reported)      NS SOLN 50 mL with folic acid 5 MG/ML SOLN 10 mg 10 mg by Intravenous route Once. (Patient not taking: Reported on 9/25/2023) 50 mL     epinephrine (ADRENALIN) 0.1 MG/ML SOLN 2 mg by Injection route Once.      Apoaequorin (PREVAGEN PO) Take 40 mg by mouth. (Patient not taking: Reported on 6/14/2021)      Potassium 99 MG TABS Take  by mouth. (Patient not taking: Reported on 9/25/2023)       No current facility-administered medications for this visit.     She  has a past medical history of Chronic fatigue, Infectious disease, Raynauds syndrome, Snoring, Unspecified disorder of thyroid, and Urinary bladder disorder.    ROS   Other than what is mentioned in HPI or physical exam, there is no history of headaches, double vision or blurred vision.  No trouble swallowing difficulties .  No chest complaints including chest pain, cough or sputum production. No GI complaints  including nausea, vomiting, change in bowel habits, or past peptic ulcer disease. No history of blood in the stools. No urinary complaints including dysuria or frequency. No history of alopecia, photosensitivity     Objective:     /60   Pulse 72   Temp 36.9 °C (98.4 °F) (Temporal)   Resp 14   Wt 52.2 kg (115 lb)  Body mass index is 20.37 kg/m².   Physical Exam:    Constitutional: Alert and oriented X3, patient is talkative with good eye contact.Skin: Warm, dry, good turgor, no rashes in visible areas, do not appreciate any hide bound skin or any calcinosis cutis.Eye: Equal, round and reactive, conjunctiva clear, lids normal EOM intactENMT: Lips without lesions,  pinna without deformityNeck: Trachea midline, no masses, no thyromegaly.Lymph:  No cervical lymphadenopathy, no axillary lymphadenopathy, no supraclavicular lymphadenopathyRespiratory: Unlabored respiratory effort, lungs clear to auscultation, no wheezes, no ronchi.Cardiovascular: Normal S1, S2, Regular rate and rhythm, no murmurs rubs or gallops  .Abdomen: Soft, non-distended.Psych: Alert and oriented x3, normal affect and mood.Neuro: Cranial nerves 2-12 are grossly intact Ext:no joint laxity noted in bilateral arms, no joint laxity noted in bilateral legs, no active Raynaud's today, no digital tip ulcerations, no son sclerodactyly appreciated, shoulders full range of motion without limitations, no flexion contractures in the elbows    Lab Results   Component Value Date/Time    SODIUM 139 07/10/2023 02:35 PM    POTASSIUM 3.9 07/10/2023 02:35 PM    CHLORIDE 101 07/10/2023 02:35 PM    CO2 25 07/10/2023 02:35 PM    GLUCOSE 90 07/10/2023 02:35 PM    BUN 16 07/10/2023 02:35 PM    CREATININE 0.63 07/10/2023 02:35 PM      Lab Results   Component Value Date/Time    WBC 7.7 07/10/2023 02:35 PM    RBC 4.97 07/10/2023 02:35 PM    HEMOGLOBIN 15.0 07/10/2023 02:35 PM    HEMATOCRIT 45.4 07/10/2023 02:35 PM    MCV 91.3 07/10/2023 02:35 PM    MCH 30.2  07/10/2023 02:35 PM    MCHC 33.0 07/10/2023 02:35 PM    MPV 9.4 07/10/2023 02:35 PM    NEUTSPOLYS 61.80 07/10/2023 02:35 PM    LYMPHOCYTES 26.70 07/10/2023 02:35 PM    MONOCYTES 8.20 07/10/2023 02:35 PM    EOSINOPHILS 1.60 07/10/2023 02:35 PM    BASOPHILS 1.40 07/10/2023 02:35 PM      Lab Results   Component Value Date/Time    CALCIUM 9.4 07/10/2023 02:35 PM    ASTSGOT 14 07/10/2023 02:35 PM    ALTSGPT 16 07/10/2023 02:35 PM    ALKPHOSPHAT 52 07/10/2023 02:35 PM    TBILIRUBIN 0.6 07/10/2023 02:35 PM    ALBUMIN 4.8 07/10/2023 02:35 PM    TOTPROTEIN 7.7 07/10/2023 02:35 PM     Lab Results   Component Value Date/Time    URICACID 3.5 07/23/2019 02:29 PM    RHEUMFACTN 12 07/06/2021 01:54 PM    ANTINUCAB Detected (A) 07/31/2020 11:16 AM     Lab Results   Component Value Date/Time    IGGCARDIOLI <10 08/31/2023 11:28 AM    IGMCARDIOLI 12 08/31/2023 11:28 AM    IGACARDIOLI <10 08/31/2023 11:28 AM     Lab Results   Component Value Date/Time    CENTROMBAB 194 (H) 07/06/2021 01:54 PM     Lab Results   Component Value Date/Time    SEDRATEWES 4 07/31/2020 11:16 AM     Lab Results   Component Value Date/Time    HBA1C 5.1 07/10/2023 02:35 PM     Lab Results   Component Value Date/Time    MICROSOMALA <9.0 05/05/2022 12:57 PM    ANTITHYROGL <0.9 05/05/2022 12:57 PM     Lab Results   Component Value Date/Time    ANTIMITOCHO 56.7 (H) 07/10/2023 02:35 PM     Lab Results   Component Value Date/Time    PTHINTACT 35.7 10/18/2022 10:23 AM     Assessment and Plan:     1. CREST syndrome (HCC)  Clinically stable, symptoms treated symptomatically, patient's last PFTs were December 2020 we will schedule PFTs to assure no progression to any restrictive lung disease.  - PULMONARY FUNCTION TESTS -Test requested: Complete Pulmonary Function Test; Future    2. Raynaud's disease without gangrene  Stable, patient uses mechanical measures to treat, patient also has nitroglycerin ointment to use as needed  - PULMONARY FUNCTION TESTS -Test requested:  Complete Pulmonary Function Test; Future    3. Sicca, unspecified type (HCC)  Of note Sjogren's antibodies negative, status post trial of evoxac which caused a headache    4. Autoimmune hepatitis (HCC)  Stable per patient report    5. Osteoporosis, unspecified osteoporosis type, unspecified pathological fracture presence  Last DEXA September 2022 next DEXA September 2024, DEXA does show osteoporosis for which patient is opting out of any treatment   continue calcium citrate 1200 mg by mouth daily and vitamin D about 2000 units by mouth daily and magnesium 200 mg by mouth daily    6. Hypothyroidism, unspecified type  Followed by patients PCP, can exacerbate joint pain, I recommend monitoring thyroid on a regular basis to assure it is not contributing to the patient's joint pain    Followup: Return in about 1 year (around 9/25/2024). or sooner fabiola Gaspar  was seen 30 minutes face-to-face of which more than 50% of the time was spent counseling the patient (excluding time for procedures)  regarding  rheumatological condition and care. Therapy was discussed in detail.      Please note that this dictation was created using voice recognition software. I have made every reasonable attempt to correct obvious errors, but I expect that there are errors of grammar and possibly content that I did not discover before finalizing the note.

## 2023-12-28 ENCOUNTER — HOSPITAL ENCOUNTER (OUTPATIENT)
Dept: RADIOLOGY | Facility: MEDICAL CENTER | Age: 58
End: 2023-12-28
Attending: FAMILY MEDICINE
Payer: COMMERCIAL

## 2023-12-28 DIAGNOSIS — M81.0 AGE-RELATED OSTEOPOROSIS WITHOUT CURRENT PATHOLOGICAL FRACTURE: ICD-10-CM

## 2023-12-28 PROCEDURE — 77080 DXA BONE DENSITY AXIAL: CPT

## 2024-03-13 ENCOUNTER — HOSPITAL ENCOUNTER (OUTPATIENT)
Dept: LAB | Facility: MEDICAL CENTER | Age: 59
End: 2024-03-13
Attending: FAMILY MEDICINE
Payer: COMMERCIAL

## 2024-03-13 LAB
25(OH)D3 SERPL-MCNC: 68 NG/ML (ref 30–100)
BASOPHILS # BLD AUTO: 1.3 % (ref 0–1.8)
BASOPHILS # BLD: 0.08 K/UL (ref 0–0.12)
DHEA-S SERPL-MCNC: 100 UG/DL (ref 18.9–205)
EOSINOPHIL # BLD AUTO: 0.15 K/UL (ref 0–0.51)
EOSINOPHIL NFR BLD: 2.5 % (ref 0–6.9)
ERYTHROCYTE [DISTWIDTH] IN BLOOD BY AUTOMATED COUNT: 48.4 FL (ref 35.9–50)
EST. AVERAGE GLUCOSE BLD GHB EST-MCNC: 94 MG/DL
HBA1C MFR BLD: 4.9 % (ref 4–5.6)
HCT VFR BLD AUTO: 45.8 % (ref 37–47)
HGB BLD-MCNC: 15.4 G/DL (ref 12–16)
IMM GRANULOCYTES # BLD AUTO: 0.01 K/UL (ref 0–0.11)
IMM GRANULOCYTES NFR BLD AUTO: 0.2 % (ref 0–0.9)
LYMPHOCYTES # BLD AUTO: 1.77 K/UL (ref 1–4.8)
LYMPHOCYTES NFR BLD: 28.9 % (ref 22–41)
MCH RBC QN AUTO: 29.4 PG (ref 27–33)
MCHC RBC AUTO-ENTMCNC: 33.6 G/DL (ref 32.2–35.5)
MCV RBC AUTO: 87.4 FL (ref 81.4–97.8)
MONOCYTES # BLD AUTO: 0.59 K/UL (ref 0–0.85)
MONOCYTES NFR BLD AUTO: 9.6 % (ref 0–13.4)
NEUTROPHILS # BLD AUTO: 3.52 K/UL (ref 1.82–7.42)
NEUTROPHILS NFR BLD: 57.5 % (ref 44–72)
NRBC # BLD AUTO: 0 K/UL
NRBC BLD-RTO: 0 /100 WBC (ref 0–0.2)
PLATELET # BLD AUTO: 431 K/UL (ref 164–446)
PMV BLD AUTO: 9.6 FL (ref 9–12.9)
RBC # BLD AUTO: 5.24 M/UL (ref 4.2–5.4)
T3FREE SERPL-MCNC: 1.69 PG/ML (ref 2–4.4)
THYROPEROXIDASE AB SERPL-ACNC: <9 IU/ML (ref 0–9)
TSH SERPL DL<=0.005 MIU/L-ACNC: 4.16 UIU/ML (ref 0.38–5.33)
WBC # BLD AUTO: 6.1 K/UL (ref 4.8–10.8)

## 2024-03-13 PROCEDURE — 84482 T3 REVERSE: CPT

## 2024-03-13 PROCEDURE — 86141 C-REACTIVE PROTEIN HS: CPT

## 2024-03-13 PROCEDURE — 80053 COMPREHEN METABOLIC PANEL: CPT

## 2024-03-13 PROCEDURE — 80061 LIPID PANEL: CPT

## 2024-03-13 PROCEDURE — 83036 HEMOGLOBIN GLYCOSYLATED A1C: CPT

## 2024-03-13 PROCEDURE — 84443 ASSAY THYROID STIM HORMONE: CPT

## 2024-03-13 PROCEDURE — 84305 ASSAY OF SOMATOMEDIN: CPT

## 2024-03-13 PROCEDURE — 84146 ASSAY OF PROLACTIN: CPT

## 2024-03-13 PROCEDURE — 82670 ASSAY OF TOTAL ESTRADIOL: CPT

## 2024-03-13 PROCEDURE — 84140 ASSAY OF PREGNENOLONE: CPT

## 2024-03-13 PROCEDURE — 84402 ASSAY OF FREE TESTOSTERONE: CPT

## 2024-03-13 PROCEDURE — 83090 ASSAY OF HOMOCYSTEINE: CPT

## 2024-03-13 PROCEDURE — 84481 FREE ASSAY (FT-3): CPT

## 2024-03-13 PROCEDURE — 82306 VITAMIN D 25 HYDROXY: CPT

## 2024-03-13 PROCEDURE — 85025 COMPLETE CBC W/AUTO DIFF WBC: CPT

## 2024-03-13 PROCEDURE — 86376 MICROSOMAL ANTIBODY EACH: CPT

## 2024-03-13 PROCEDURE — 84403 ASSAY OF TOTAL TESTOSTERONE: CPT

## 2024-03-13 PROCEDURE — 84270 ASSAY OF SEX HORMONE GLOBUL: CPT

## 2024-03-13 PROCEDURE — 83525 ASSAY OF INSULIN: CPT

## 2024-03-13 PROCEDURE — 82627 DEHYDROEPIANDROSTERONE: CPT

## 2024-03-13 PROCEDURE — 36415 COLL VENOUS BLD VENIPUNCTURE: CPT

## 2024-03-13 PROCEDURE — 84144 ASSAY OF PROGESTERONE: CPT

## 2024-03-13 PROCEDURE — 82679 ASSAY OF ESTRONE: CPT

## 2024-03-13 PROCEDURE — 86800 THYROGLOBULIN ANTIBODY: CPT

## 2024-03-14 LAB
ALBUMIN SERPL BCP-MCNC: 4.8 G/DL (ref 3.2–4.9)
ALBUMIN/GLOB SERPL: 1.8 G/DL
ALP SERPL-CCNC: 56 U/L (ref 30–99)
ALT SERPL-CCNC: 15 U/L (ref 2–50)
ANION GAP SERPL CALC-SCNC: 12 MMOL/L (ref 7–16)
AST SERPL-CCNC: 18 U/L (ref 12–45)
BILIRUB SERPL-MCNC: 0.3 MG/DL (ref 0.1–1.5)
BUN SERPL-MCNC: 15 MG/DL (ref 8–22)
CALCIUM ALBUM COR SERPL-MCNC: 8.6 MG/DL (ref 8.5–10.5)
CALCIUM SERPL-MCNC: 9.2 MG/DL (ref 8.5–10.5)
CHLORIDE SERPL-SCNC: 100 MMOL/L (ref 96–112)
CHOLEST SERPL-MCNC: 323 MG/DL (ref 100–199)
CO2 SERPL-SCNC: 25 MMOL/L (ref 20–33)
CREAT SERPL-MCNC: 0.61 MG/DL (ref 0.5–1.4)
CRP SERPL HS-MCNC: 0.6 MG/L (ref 0–3)
ESTRADIOL SERPL-MCNC: <5 PG/ML
FASTING STATUS PATIENT QL REPORTED: 1
GFR SERPLBLD CREATININE-BSD FMLA CKD-EPI: 102 ML/MIN/1.73 M 2
GLOBULIN SER CALC-MCNC: 2.6 G/DL (ref 1.9–3.5)
GLUCOSE SERPL-MCNC: 95 MG/DL (ref 65–99)
HCYS SERPL-SCNC: 10.59 UMOL/L
HDLC SERPL-MCNC: 114 MG/DL
LDLC SERPL CALC-MCNC: 200 MG/DL
POTASSIUM SERPL-SCNC: 4.2 MMOL/L (ref 3.6–5.5)
PROGEST SERPL-MCNC: 0.41 NG/ML
PROLACTIN SERPL-MCNC: 10.7 NG/ML (ref 2.8–26)
PROT SERPL-MCNC: 7.4 G/DL (ref 6–8.2)
SODIUM SERPL-SCNC: 137 MMOL/L (ref 135–145)
TRIGL SERPL-MCNC: 46 MG/DL (ref 0–149)

## 2024-03-15 LAB
IGF-I SERPL-MCNC: 112 NG/ML (ref 43–241)
IGF-I Z-SCORE SERPL: -0.1
INSULIN P FAST SERPL-ACNC: 6 UIU/ML (ref 3–25)
THYROGLOB AB SERPL-ACNC: <0.9 IU/ML (ref 0–4)

## 2024-03-17 LAB
ESTRONE SERPL-MCNC: 20.9 PG/ML
T3REVERSE SERPL-MCNC: 13.1 NG/DL (ref 9–27)

## 2024-03-18 LAB
SHBG SERPL-SCNC: 162 NMOL/L (ref 17–125)
TESTOST FREE SERPL-MCNC: 1.2 PG/ML (ref 0.6–3.8)
TESTOST SERPL-MCNC: 23 NG/DL (ref 5–32)

## 2024-03-19 LAB — PREG SERPL-MCNC: 80 NG/DL (ref 15–132)

## 2024-04-02 ENCOUNTER — HOSPITAL ENCOUNTER (OUTPATIENT)
Dept: PULMONOLOGY | Facility: MEDICAL CENTER | Age: 59
End: 2024-04-02
Attending: INTERNAL MEDICINE
Payer: COMMERCIAL

## 2024-04-02 PROCEDURE — 94726 PLETHYSMOGRAPHY LUNG VOLUMES: CPT

## 2024-04-02 PROCEDURE — 94726 PLETHYSMOGRAPHY LUNG VOLUMES: CPT | Mod: 26 | Performed by: INTERNAL MEDICINE

## 2024-04-02 PROCEDURE — 94729 DIFFUSING CAPACITY: CPT | Mod: 26 | Performed by: INTERNAL MEDICINE

## 2024-04-02 PROCEDURE — 94060 EVALUATION OF WHEEZING: CPT

## 2024-04-02 PROCEDURE — 94729 DIFFUSING CAPACITY: CPT

## 2024-04-02 PROCEDURE — 94060 EVALUATION OF WHEEZING: CPT | Mod: 26 | Performed by: INTERNAL MEDICINE

## 2024-04-02 ASSESSMENT — PULMONARY FUNCTION TESTS
FEV1_PERCENT_PREDICTED: 90
FVC: 3.17
FEV1/FVC: 81
FVC_PREDICTED: 3.6
FEV1_PREDICTED: 2.84
FEV1/FVC: 81
FEV1/FVC_PREDICTED: 79
FEV1_LLN: 2.37
FEV1: 2.57
FEV1/FVC_PERCENT_PREDICTED: 102
FEV1/FVC_PERCENT_PREDICTED: 103
FEV1/FVC_PERCENT_PREDICTED: 79
FVC_LLN: 3.01
FVC_PERCENT_PREDICTED: 87
FEV1/FVC_PERCENT_LLN: 66

## 2024-04-09 NOTE — PROCEDURES
DATE OF SERVICE:  04/02/2024     PULMONARY FUNCTION TEST INTERPRETATION     IMPRESSION:  1.  There is no obstruction.  2.  Bronchodilator response was not tested.  3.  Lung volumes are normal, TLC is 5.71 liters/102% predicted.  4.  DLCO is normal.  5.  Flow volume loop appears normal.     Normal PFTs.        ______________________________  DO YOAN Bell/DOMINIQUE    DD:  04/08/2024 17:09  DT:  04/08/2024 17:42    Job#:  [id number]

## 2025-01-02 ENCOUNTER — HOSPITAL ENCOUNTER (OUTPATIENT)
Dept: LAB | Facility: MEDICAL CENTER | Age: 60
End: 2025-01-02
Attending: FAMILY MEDICINE
Payer: COMMERCIAL

## 2025-01-02 LAB
BASOPHILS # BLD AUTO: 1.6 % (ref 0–1.8)
BASOPHILS # BLD: 0.11 K/UL (ref 0–0.12)
EOSINOPHIL # BLD AUTO: 0.09 K/UL (ref 0–0.51)
EOSINOPHIL NFR BLD: 1.3 % (ref 0–6.9)
ERYTHROCYTE [DISTWIDTH] IN BLOOD BY AUTOMATED COUNT: 47 FL (ref 35.9–50)
EST. AVERAGE GLUCOSE BLD GHB EST-MCNC: 103 MG/DL
HBA1C MFR BLD: 5.2 % (ref 4–5.6)
HCT VFR BLD AUTO: 48.4 % (ref 37–47)
HGB BLD-MCNC: 15.9 G/DL (ref 12–16)
IMM GRANULOCYTES # BLD AUTO: 0.03 K/UL (ref 0–0.11)
IMM GRANULOCYTES NFR BLD AUTO: 0.4 % (ref 0–0.9)
LYMPHOCYTES # BLD AUTO: 1.75 K/UL (ref 1–4.8)
LYMPHOCYTES NFR BLD: 25.4 % (ref 22–41)
MCH RBC QN AUTO: 29.1 PG (ref 27–33)
MCHC RBC AUTO-ENTMCNC: 32.9 G/DL (ref 32.2–35.5)
MCV RBC AUTO: 88.5 FL (ref 81.4–97.8)
MONOCYTES # BLD AUTO: 0.43 K/UL (ref 0–0.85)
MONOCYTES NFR BLD AUTO: 6.2 % (ref 0–13.4)
NEUTROPHILS # BLD AUTO: 4.49 K/UL (ref 1.82–7.42)
NEUTROPHILS NFR BLD: 65.1 % (ref 44–72)
NRBC # BLD AUTO: 0 K/UL
NRBC BLD-RTO: 0 /100 WBC (ref 0–0.2)
PLATELET # BLD AUTO: 473 K/UL (ref 164–446)
PMV BLD AUTO: 9.1 FL (ref 9–12.9)
RBC # BLD AUTO: 5.47 M/UL (ref 4.2–5.4)
WBC # BLD AUTO: 6.9 K/UL (ref 4.8–10.8)

## 2025-01-02 PROCEDURE — 84140 ASSAY OF PREGNENOLONE: CPT

## 2025-01-02 PROCEDURE — 86431 RHEUMATOID FACTOR QUANT: CPT

## 2025-01-02 PROCEDURE — 84403 ASSAY OF TOTAL TESTOSTERONE: CPT

## 2025-01-02 PROCEDURE — 84144 ASSAY OF PROGESTERONE: CPT

## 2025-01-02 PROCEDURE — 83036 HEMOGLOBIN GLYCOSYLATED A1C: CPT

## 2025-01-02 PROCEDURE — 86376 MICROSOMAL ANTIBODY EACH: CPT

## 2025-01-02 PROCEDURE — 82679 ASSAY OF ESTRONE: CPT

## 2025-01-02 PROCEDURE — 84270 ASSAY OF SEX HORMONE GLOBUL: CPT

## 2025-01-02 PROCEDURE — 82306 VITAMIN D 25 HYDROXY: CPT

## 2025-01-02 PROCEDURE — 84443 ASSAY THYROID STIM HORMONE: CPT

## 2025-01-02 PROCEDURE — 83090 ASSAY OF HOMOCYSTEINE: CPT

## 2025-01-02 PROCEDURE — 80061 LIPID PANEL: CPT

## 2025-01-02 PROCEDURE — 84482 T3 REVERSE: CPT

## 2025-01-02 PROCEDURE — 84305 ASSAY OF SOMATOMEDIN: CPT

## 2025-01-02 PROCEDURE — 85025 COMPLETE CBC W/AUTO DIFF WBC: CPT

## 2025-01-02 PROCEDURE — 80053 COMPREHEN METABOLIC PANEL: CPT

## 2025-01-02 PROCEDURE — 84481 FREE ASSAY (FT-3): CPT

## 2025-01-02 PROCEDURE — 86381 MITOCHONDRIAL ANTIBODY EACH: CPT

## 2025-01-02 PROCEDURE — 84550 ASSAY OF BLOOD/URIC ACID: CPT

## 2025-01-02 PROCEDURE — 83516 IMMUNOASSAY NONANTIBODY: CPT

## 2025-01-02 PROCEDURE — 36415 COLL VENOUS BLD VENIPUNCTURE: CPT

## 2025-01-02 PROCEDURE — 82670 ASSAY OF TOTAL ESTRADIOL: CPT

## 2025-01-02 PROCEDURE — 83525 ASSAY OF INSULIN: CPT

## 2025-01-02 PROCEDURE — 86200 CCP ANTIBODY: CPT

## 2025-01-02 PROCEDURE — 86141 C-REACTIVE PROTEIN HS: CPT

## 2025-01-02 PROCEDURE — 82627 DEHYDROEPIANDROSTERONE: CPT

## 2025-01-02 PROCEDURE — 84439 ASSAY OF FREE THYROXINE: CPT

## 2025-01-02 PROCEDURE — 84402 ASSAY OF FREE TESTOSTERONE: CPT

## 2025-01-02 PROCEDURE — 86800 THYROGLOBULIN ANTIBODY: CPT

## 2025-01-03 LAB
25(OH)D3 SERPL-MCNC: 67 NG/ML (ref 30–100)
ALBUMIN SERPL BCP-MCNC: 4.8 G/DL (ref 3.2–4.9)
ALBUMIN/GLOB SERPL: 1.5 G/DL
ALP SERPL-CCNC: 58 U/L (ref 30–99)
ALT SERPL-CCNC: 15 U/L (ref 2–50)
ANION GAP SERPL CALC-SCNC: 12 MMOL/L (ref 7–16)
AST SERPL-CCNC: 17 U/L (ref 12–45)
BILIRUB SERPL-MCNC: 0.5 MG/DL (ref 0.1–1.5)
BUN SERPL-MCNC: 9 MG/DL (ref 8–22)
CALCIUM ALBUM COR SERPL-MCNC: 9.3 MG/DL (ref 8.5–10.5)
CALCIUM SERPL-MCNC: 9.9 MG/DL (ref 8.5–10.5)
CHLORIDE SERPL-SCNC: 100 MMOL/L (ref 96–112)
CHOLEST SERPL-MCNC: 289 MG/DL (ref 100–199)
CO2 SERPL-SCNC: 27 MMOL/L (ref 20–33)
CREAT SERPL-MCNC: 0.6 MG/DL (ref 0.5–1.4)
CRP SERPL HS-MCNC: 0.5 MG/L (ref 0–3)
DHEA-S SERPL-MCNC: 106 UG/DL (ref 18.9–205)
ESTRADIOL SERPL-MCNC: <5 PG/ML
GFR SERPLBLD CREATININE-BSD FMLA CKD-EPI: 102 ML/MIN/1.73 M 2
GLOBULIN SER CALC-MCNC: 3.1 G/DL (ref 1.9–3.5)
GLUCOSE SERPL-MCNC: 86 MG/DL (ref 65–99)
HCYS SERPL-SCNC: 5.93 UMOL/L
HDLC SERPL-MCNC: 93 MG/DL
LDLC SERPL CALC-MCNC: 180 MG/DL
POTASSIUM SERPL-SCNC: 4.6 MMOL/L (ref 3.6–5.5)
PROGEST SERPL-MCNC: 0.21 NG/ML
PROT SERPL-MCNC: 7.9 G/DL (ref 6–8.2)
RHEUMATOID FACT SER IA-ACNC: 16 IU/ML (ref 0–14)
SODIUM SERPL-SCNC: 139 MMOL/L (ref 135–145)
T3FREE SERPL-MCNC: 2.25 PG/ML (ref 2–4.4)
T4 FREE SERPL-MCNC: 1.23 NG/DL (ref 0.93–1.7)
THYROPEROXIDASE AB SERPL-ACNC: 10 IU/ML (ref 0–9)
TRIGL SERPL-MCNC: 82 MG/DL (ref 0–149)
TSH SERPL-ACNC: 5.96 UIU/ML (ref 0.35–5.5)
URATE SERPL-MCNC: 5.5 MG/DL (ref 1.9–8.2)

## 2025-01-04 LAB
IGF-I SERPL-MCNC: 91 NG/ML (ref 43–241)
IGF-I Z-SCORE SERPL: -0.6
INSULIN P FAST SERPL-ACNC: 3 UIU/ML (ref 3–25)
MITOCHONDRIA M2 IGG SER-ACNC: 65.5 UNITS (ref 0–24.9)
THYROGLOB AB SERPL-ACNC: 17.5 IU/ML (ref 0–4)

## 2025-01-05 LAB
CARBAMYLATED PROTEIN ANTIBODY, IGG Q6043: 6 UNITS (ref 0–19)
CCP IGA+IGG SERPL IA-ACNC: 7 UNITS (ref 0–19)
ESTRONE SERPL-MCNC: 18.5 PG/ML
PREG SERPL-MCNC: 70 NG/DL (ref 15–132)
RHEUMATOID FACT SER NEPH-ACNC: 14 IU/ML (ref 0–14)
T3REVERSE SERPL-MCNC: 16.7 NG/DL (ref 9–27)

## 2025-01-06 LAB
SHBG SERPL-SCNC: 173 NMOL/L (ref 17–125)
TESTOST FREE SERPL-MCNC: 0.8 PG/ML (ref 0.6–3.8)
TESTOST SERPL-MCNC: 17 NG/DL (ref 5–32)

## 2025-03-11 ENCOUNTER — HOSPITAL ENCOUNTER (OUTPATIENT)
Dept: LAB | Facility: MEDICAL CENTER | Age: 60
End: 2025-03-11
Attending: REGISTERED NURSE
Payer: COMMERCIAL

## 2025-03-11 PROCEDURE — 82955 ASSAY OF G6PD ENZYME: CPT

## 2025-03-11 PROCEDURE — 36415 COLL VENOUS BLD VENIPUNCTURE: CPT

## 2025-03-13 LAB — G6PD RBC-CCNC: 9.4 U/G HB (ref 9.9–16.6)

## 2025-03-20 ENCOUNTER — HOSPITAL ENCOUNTER (OUTPATIENT)
Dept: RADIOLOGY | Facility: MEDICAL CENTER | Age: 60
End: 2025-03-20
Attending: FAMILY MEDICINE
Payer: COMMERCIAL

## 2025-03-20 DIAGNOSIS — I20.9 ANGINAL SYNDROME (HCC): ICD-10-CM

## 2025-03-20 PROCEDURE — 4410556 CT-CARDIAC SCORING (SELF PAY ONLY)

## 2025-03-28 ENCOUNTER — HOSPITAL ENCOUNTER (OUTPATIENT)
Dept: LAB | Facility: MEDICAL CENTER | Age: 60
End: 2025-03-28
Attending: REGISTERED NURSE
Payer: COMMERCIAL

## 2025-03-28 LAB
BASOPHILS # BLD AUTO: 1.5 % (ref 0–1.8)
BASOPHILS # BLD: 0.11 K/UL (ref 0–0.12)
EOSINOPHIL # BLD AUTO: 0.2 K/UL (ref 0–0.51)
EOSINOPHIL NFR BLD: 2.6 % (ref 0–6.9)
ERYTHROCYTE [DISTWIDTH] IN BLOOD BY AUTOMATED COUNT: 46.9 FL (ref 35.9–50)
FERRITIN SERPL-MCNC: 147 NG/ML (ref 10–291)
HCT VFR BLD AUTO: 44.6 % (ref 37–47)
HCYS SERPL-SCNC: 6.83 UMOL/L
HGB BLD-MCNC: 14.4 G/DL (ref 12–16)
IMM GRANULOCYTES # BLD AUTO: 0.01 K/UL (ref 0–0.11)
IMM GRANULOCYTES NFR BLD AUTO: 0.1 % (ref 0–0.9)
LYMPHOCYTES # BLD AUTO: 2.13 K/UL (ref 1–4.8)
LYMPHOCYTES NFR BLD: 28.2 % (ref 22–41)
MCH RBC QN AUTO: 29.1 PG (ref 27–33)
MCHC RBC AUTO-ENTMCNC: 32.3 G/DL (ref 32.2–35.5)
MCV RBC AUTO: 90.3 FL (ref 81.4–97.8)
MONOCYTES # BLD AUTO: 0.67 K/UL (ref 0–0.85)
MONOCYTES NFR BLD AUTO: 8.9 % (ref 0–13.4)
NEUTROPHILS # BLD AUTO: 4.44 K/UL (ref 1.82–7.42)
NEUTROPHILS NFR BLD: 58.7 % (ref 44–72)
NRBC # BLD AUTO: 0 K/UL
NRBC BLD-RTO: 0 /100 WBC (ref 0–0.2)
PLATELET # BLD AUTO: 383 K/UL (ref 164–446)
PMV BLD AUTO: 9.5 FL (ref 9–12.9)
RBC # BLD AUTO: 4.94 M/UL (ref 4.2–5.4)
WBC # BLD AUTO: 7.6 K/UL (ref 4.8–10.8)

## 2025-03-28 PROCEDURE — 82728 ASSAY OF FERRITIN: CPT

## 2025-03-28 PROCEDURE — 36415 COLL VENOUS BLD VENIPUNCTURE: CPT

## 2025-03-28 PROCEDURE — 83090 ASSAY OF HOMOCYSTEINE: CPT

## 2025-03-28 PROCEDURE — 85025 COMPLETE CBC W/AUTO DIFF WBC: CPT

## 2025-04-24 ENCOUNTER — HOSPITAL ENCOUNTER (OUTPATIENT)
Dept: LAB | Facility: MEDICAL CENTER | Age: 60
End: 2025-04-24
Attending: REGISTERED NURSE
Payer: COMMERCIAL

## 2025-04-24 LAB
DHEA-S SERPL-MCNC: 124 UG/DL (ref 18.9–205)
ESTRADIOL SERPL-MCNC: 53.8 PG/ML
LH SERPL-ACNC: 26.9 IU/L
PROGEST SERPL-MCNC: 2.26 NG/ML

## 2025-04-24 PROCEDURE — 82627 DEHYDROEPIANDROSTERONE: CPT

## 2025-04-24 PROCEDURE — 82670 ASSAY OF TOTAL ESTRADIOL: CPT

## 2025-04-24 PROCEDURE — 84144 ASSAY OF PROGESTERONE: CPT

## 2025-04-24 PROCEDURE — 84403 ASSAY OF TOTAL TESTOSTERONE: CPT

## 2025-04-24 PROCEDURE — 36415 COLL VENOUS BLD VENIPUNCTURE: CPT

## 2025-04-24 PROCEDURE — 83002 ASSAY OF GONADOTROPIN (LH): CPT

## 2025-04-29 LAB — TESTOST SERPL-MCNC: 57 NG/DL (ref 5–32)

## 2025-06-20 ENCOUNTER — HOSPITAL ENCOUNTER (OUTPATIENT)
Dept: LAB | Facility: MEDICAL CENTER | Age: 60
End: 2025-06-20
Attending: FAMILY MEDICINE
Payer: COMMERCIAL

## 2025-06-20 LAB
25(OH)D3 SERPL-MCNC: 63 NG/ML (ref 30–100)
ALBUMIN SERPL BCP-MCNC: 4.3 G/DL (ref 3.2–4.9)
ALBUMIN/GLOB SERPL: 1.5 G/DL
ALP SERPL-CCNC: 75 U/L (ref 30–99)
ALT SERPL-CCNC: 18 U/L (ref 2–50)
ANION GAP SERPL CALC-SCNC: 12 MMOL/L (ref 7–16)
AST SERPL-CCNC: 16 U/L (ref 12–45)
BASOPHILS # BLD AUTO: 1 % (ref 0–1.8)
BASOPHILS # BLD: 0.08 K/UL (ref 0–0.12)
BILIRUB SERPL-MCNC: 0.3 MG/DL (ref 0.1–1.5)
BUN SERPL-MCNC: 26 MG/DL (ref 8–22)
CALCIUM ALBUM COR SERPL-MCNC: 8.7 MG/DL (ref 8.5–10.5)
CALCIUM SERPL-MCNC: 8.9 MG/DL (ref 8.5–10.5)
CHLORIDE SERPL-SCNC: 101 MMOL/L (ref 96–112)
CHOLEST SERPL-MCNC: 243 MG/DL (ref 100–199)
CO2 SERPL-SCNC: 22 MMOL/L (ref 20–33)
CREAT SERPL-MCNC: 0.78 MG/DL (ref 0.5–1.4)
CRP SERPL HS-MCNC: 0.6 MG/L (ref 0–3)
DHEA-S SERPL-MCNC: 79.2 UG/DL (ref 18.9–205)
EOSINOPHIL # BLD AUTO: 0.33 K/UL (ref 0–0.51)
EOSINOPHIL NFR BLD: 4 % (ref 0–6.9)
ERYTHROCYTE [DISTWIDTH] IN BLOOD BY AUTOMATED COUNT: 48.5 FL (ref 35.9–50)
EST. AVERAGE GLUCOSE BLD GHB EST-MCNC: 100 MG/DL
ESTRADIOL SERPL-MCNC: 55.3 PG/ML
FERRITIN SERPL-MCNC: 113 NG/ML (ref 10–291)
FOLATE SERPL-MCNC: 13.6 NG/ML
FSH SERPL-ACNC: 45.4 MIU/ML
GFR SERPLBLD CREATININE-BSD FMLA CKD-EPI: 86 ML/MIN/1.73 M 2
GLOBULIN SER CALC-MCNC: 2.8 G/DL (ref 1.9–3.5)
GLUCOSE SERPL-MCNC: 96 MG/DL (ref 65–99)
HBA1C MFR BLD: 5.1 % (ref 4–5.6)
HCT VFR BLD AUTO: 41.5 % (ref 37–47)
HCYS SERPL-SCNC: 6.16 UMOL/L
HDLC SERPL-MCNC: 100 MG/DL
HGB BLD-MCNC: 13.5 G/DL (ref 12–16)
IMM GRANULOCYTES # BLD AUTO: 0.03 K/UL (ref 0–0.11)
IMM GRANULOCYTES NFR BLD AUTO: 0.4 % (ref 0–0.9)
LDLC SERPL CALC-MCNC: 135 MG/DL
LH SERPL-ACNC: 34.2 IU/L
LYMPHOCYTES # BLD AUTO: 2.08 K/UL (ref 1–4.8)
LYMPHOCYTES NFR BLD: 25.2 % (ref 22–41)
MCH RBC QN AUTO: 28.9 PG (ref 27–33)
MCHC RBC AUTO-ENTMCNC: 32.5 G/DL (ref 32.2–35.5)
MCV RBC AUTO: 88.9 FL (ref 81.4–97.8)
MONOCYTES # BLD AUTO: 0.69 K/UL (ref 0–0.85)
MONOCYTES NFR BLD AUTO: 8.3 % (ref 0–13.4)
NEUTROPHILS # BLD AUTO: 5.06 K/UL (ref 1.82–7.42)
NEUTROPHILS NFR BLD: 61.1 % (ref 44–72)
NRBC # BLD AUTO: 0 K/UL
NRBC BLD-RTO: 0 /100 WBC (ref 0–0.2)
PLATELET # BLD AUTO: 441 K/UL (ref 164–446)
PMV BLD AUTO: 9.2 FL (ref 9–12.9)
POTASSIUM SERPL-SCNC: 4.1 MMOL/L (ref 3.6–5.5)
PROGEST SERPL-MCNC: 4.11 NG/ML
PROLACTIN SERPL-MCNC: 16.2 NG/ML (ref 2.8–26)
PROT SERPL-MCNC: 7.1 G/DL (ref 6–8.2)
RBC # BLD AUTO: 4.67 M/UL (ref 4.2–5.4)
SODIUM SERPL-SCNC: 135 MMOL/L (ref 135–145)
T3FREE SERPL-MCNC: 3.56 PG/ML (ref 2–4.4)
T4 FREE SERPL-MCNC: 1.21 NG/DL (ref 0.93–1.7)
THYROPEROXIDASE AB SERPL-ACNC: 10.8 IU/ML (ref 0–9)
TRIGL SERPL-MCNC: 42 MG/DL (ref 0–149)
TSH SERPL-ACNC: 1.12 UIU/ML (ref 0.38–5.33)
URATE SERPL-MCNC: 4.3 MG/DL (ref 1.9–8.2)
VIT B12 SERPL-MCNC: 1884 PG/ML (ref 211–911)
WBC # BLD AUTO: 8.3 K/UL (ref 4.8–10.8)

## 2025-06-20 PROCEDURE — 83036 HEMOGLOBIN GLYCOSYLATED A1C: CPT

## 2025-06-20 PROCEDURE — 82679 ASSAY OF ESTRONE: CPT

## 2025-06-20 PROCEDURE — 84403 ASSAY OF TOTAL TESTOSTERONE: CPT

## 2025-06-20 PROCEDURE — 82746 ASSAY OF FOLIC ACID SERUM: CPT

## 2025-06-20 PROCEDURE — 84482 T3 REVERSE: CPT

## 2025-06-20 PROCEDURE — 84144 ASSAY OF PROGESTERONE: CPT

## 2025-06-20 PROCEDURE — 86141 C-REACTIVE PROTEIN HS: CPT

## 2025-06-20 PROCEDURE — 83090 ASSAY OF HOMOCYSTEINE: CPT

## 2025-06-20 PROCEDURE — 80053 COMPREHEN METABOLIC PANEL: CPT

## 2025-06-20 PROCEDURE — 86376 MICROSOMAL ANTIBODY EACH: CPT

## 2025-06-20 PROCEDURE — 84402 ASSAY OF FREE TESTOSTERONE: CPT

## 2025-06-20 PROCEDURE — 36415 COLL VENOUS BLD VENIPUNCTURE: CPT

## 2025-06-20 PROCEDURE — 83525 ASSAY OF INSULIN: CPT

## 2025-06-20 PROCEDURE — 82627 DEHYDROEPIANDROSTERONE: CPT

## 2025-06-20 PROCEDURE — 84443 ASSAY THYROID STIM HORMONE: CPT

## 2025-06-20 PROCEDURE — 82607 VITAMIN B-12: CPT

## 2025-06-20 PROCEDURE — 84146 ASSAY OF PROLACTIN: CPT

## 2025-06-20 PROCEDURE — 82670 ASSAY OF TOTAL ESTRADIOL: CPT

## 2025-06-20 PROCEDURE — 83002 ASSAY OF GONADOTROPIN (LH): CPT

## 2025-06-20 PROCEDURE — 84140 ASSAY OF PREGNENOLONE: CPT

## 2025-06-20 PROCEDURE — 86800 THYROGLOBULIN ANTIBODY: CPT

## 2025-06-20 PROCEDURE — 82306 VITAMIN D 25 HYDROXY: CPT

## 2025-06-20 PROCEDURE — 84550 ASSAY OF BLOOD/URIC ACID: CPT

## 2025-06-20 PROCEDURE — 85025 COMPLETE CBC W/AUTO DIFF WBC: CPT

## 2025-06-20 PROCEDURE — 84270 ASSAY OF SEX HORMONE GLOBUL: CPT

## 2025-06-20 PROCEDURE — 83001 ASSAY OF GONADOTROPIN (FSH): CPT

## 2025-06-20 PROCEDURE — 84481 FREE ASSAY (FT-3): CPT

## 2025-06-20 PROCEDURE — 84439 ASSAY OF FREE THYROXINE: CPT

## 2025-06-20 PROCEDURE — 80061 LIPID PANEL: CPT

## 2025-06-20 PROCEDURE — 83018 HEAVY METAL QUAN EACH NES: CPT

## 2025-06-20 PROCEDURE — 82728 ASSAY OF FERRITIN: CPT

## 2025-06-20 PROCEDURE — 84305 ASSAY OF SOMATOMEDIN: CPT

## 2025-06-21 LAB
INSULIN P FAST SERPL-ACNC: 5 UIU/ML (ref 3–25)
THYROGLOB AB SERPL-ACNC: <1.5 IU/ML (ref 0–4)

## 2025-06-22 LAB
IGF-I SERPL-MCNC: 90 NG/ML (ref 41–243)
IGF-I Z-SCORE SERPL: -0.6

## 2025-06-23 LAB — PREG SERPL-MCNC: 100 NG/DL (ref 15–132)

## 2025-06-25 LAB
ESTRONE SERPL-MCNC: 32.4 PG/ML
T3REVERSE SERPL-MCNC: 14.2 NG/DL (ref 9–27)

## 2025-06-26 LAB
COLLECT DURATION TIME SPEC: ABNORMAL HR
CREAT 24H UR-MCNC: 126 MG/DL
IODINE 24H UR-MCNC: 3347.2 UG/L (ref 26–705)
IODINE 24H UR-MRATE: ABNORMAL MG/(24.H) (ref 93–1125)
IODINE/CREAT UR: 2656.5 UG/G CRT (ref 35–540)
SHBG SERPL-SCNC: 116 NMOL/L (ref 17–125)
SPECIMEN VOL ?TM UR: ABNORMAL ML
TESTOST FREE SERPL-MCNC: 3.5 PG/ML (ref 0.6–3.8)
TESTOST SERPL-MCNC: 50 NG/DL (ref 5–32)